# Patient Record
Sex: FEMALE | Race: WHITE | NOT HISPANIC OR LATINO | Employment: UNEMPLOYED | URBAN - METROPOLITAN AREA
[De-identification: names, ages, dates, MRNs, and addresses within clinical notes are randomized per-mention and may not be internally consistent; named-entity substitution may affect disease eponyms.]

---

## 2018-09-25 ENCOUNTER — HOSPITAL ENCOUNTER (OUTPATIENT)
Dept: PHYSICAL THERAPY | Facility: HOSPITAL | Age: 58
Setting detail: THERAPIES SERIES
Discharge: HOME | End: 2018-09-25

## 2018-09-25 DIAGNOSIS — M54.9 BACK PAIN, UNSPECIFIED BACK LOCATION, UNSPECIFIED BACK PAIN LATERALITY, UNSPECIFIED CHRONICITY: Primary | ICD-10-CM

## 2018-09-25 PROCEDURE — 97161 PT EVAL LOW COMPLEX 20 MIN: CPT | Performed by: PHYSICAL THERAPIST

## 2018-09-26 PROCEDURE — G8982 BODY POS GOAL STATUS: HCPCS | Performed by: PHYSICAL THERAPIST

## 2018-09-26 PROCEDURE — G8981 BODY POS CURRENT STATUS: HCPCS | Performed by: PHYSICAL THERAPIST

## 2018-09-26 NOTE — THERAPY EVALUATION
Outpatient Physical Therapy Ortho Initial Evaluation  RON Mariee     Patient Name: Noemi Magallon  : 1960  MRN: [id number]  Today's Date: 2018      Visit Date: 2018    There is no problem list on file for this patient.       Past Medical History:   Diagnosis Date   • Arthritis    • Back pain    • Disease of thyroid gland    • Hyperlipidemia    • Hypertension         Past Surgical History:   Procedure Laterality Date   • APPENDECTOMY     •  SECTION     • CHOLECYSTECTOMY         Visit Dx:     ICD-10-CM ICD-9-CM   1. Back pain, unspecified back location, unspecified back pain laterality, unspecified chronicity M54.9 724.5             Patient History     Row Name 18 1100             History    Chief Complaint Difficulty Walking;Difficulty with daily activities;Pain  -GC      Type of Pain Back pain  -GC      Brief Description of Current Complaint Pt reports her back has been bothering for over 3 years. She states she has had neck surgery for a bad disc already. She also states she has had therapy on her back before and states it did not help and she does not think it will help this time either. She states she has to do therapy so her insurance company will approve an MRI.   -GC      Patient/Caregiver Goals Relieve pain   Pt says therapy will not help  -GC      Patient's Rating of General Health Fair  -GC      Hand Dominance right-handed  -GC         Pain     Pain Location Back  -GC      Pain at Present 6  -GC      Pain at Best 6  -GC      Pain at Worst 10  -GC      Pain Frequency Constant/continuous  -GC      Pain Description Discomfort;Sore;Sharp;Tender  -GC      What Performance Factors Make the Current Problem(s) WORSE? Pt c/o constant pain that increases with any and all movements  -GC      What Performance Factors Make the Current Problem(s) BETTER? Pt feels her best when she lies down and rests, but even then she c/o pain at a 6/10.  -GC      Is your sleep disturbed? Yes  -GC       Difficulties with ADL's? Pt has pain with all household chores  -GC         Daily Activities    Primary Language English  -GC      Are you able to read Yes  -GC      Are you able to write Yes  -GC      How does patient learn best? Listening  -GC      Teaching needs identified Home Exercise Program;Management of Condition  -GC      Patient is concerned about/has problems with Walking;Transfers (getting out of a chair, bed);Standing;Performing job responsibilities/community activities (work, school,;Performing home management (household chores, shopping, care of dependents);Grasping objects lifting;Difficulty with self care (i.e. bathing, dressing, toileting:  -GC      Does patient have problems with the following? None  -GC      Barriers to learning None  -GC      Functional Status mobility issues preventing performance of daily activities  -GC      Pt Participated in POC and Goals Yes  -GC         Safety    Are you being hurt, hit, or frightened by anyone at home or in your life? No  -GC      Are you being neglected by a caregiver No  -GC        User Key  (r) = Recorded By, (t) = Taken By, (c) = Cosigned By    Initials Name Provider Type    GC Bhaskar Borja, PT Physical Therapist                PT Ortho     Row Name 09/25/18 1100       Posture/Observations    Lumbar lordosis Decreased  -GC    Posture/Observations Comments Pt has mild latreal shift of her shoulders to the right  -GC       Sensory Screen for Light Touch- Lower Quarter Clearing    L2 (anterior mid thigh) Bilateral:;Intact  -GC    L3 (distal anterior thigh) Bilateral:;Intact  -GC    L4 (medial lower leg/foot) Bilateral:;Intact  -GC    L5 (lateral lower leg/great toe) Bilateral:;Intact  -GC    S1 (bottom of foot) Bilateral:;Intact  -GC       Myotomal Screen- Lower Quarter Clearing    Hip flexion (L2) Bilateral:;5 (Normal)  -GC    Knee extension (L3) Bilateral:;5 (Normal)  -GC    Ankle DF (L4) Bilateral:;5 (Normal)  -GC    Ankle PF (S1) Bilateral:;5  (Normal)  -GC    Knee flexion (S2) Bilateral:;5 (Normal)  -GC       Lumbar/SI Special Tests    SLR (Neural Tension) Bilateral:;Negative  -GC    CHAVEZ (hip vs. SI Dysfunction) Bilateral:;Negative  -GC       Lumbosacral Palpation    SI Bilateral:;Tender  -GC    Lumbosacral Segment Tender   L2-S1  -GC    Quadratus Lumborum Bilateral:;Tender;Guarded/taut  -GC    Erector Spinae (Paraspinals) Bilateral:;Tender;Guarded/taut  -GC       Head/Neck/Trunk    Trunk Extension AROM 10% range with pain  -GC    Trunk Flexion AROM 25% range with pain  -GC    Trunk Lt Lateral Flexion AROM 50% range with pain  -GC    Trunk Rt Lateral Flexion AROM 50% range with pain  -GC    Trunk Lt Rotation AROM 50% range with pain  -GC    Trunk Rt Rotation AROM 50% range with pain  -GC       MMT Neck/Trunk    Trunk Flexion MMT, Gross Movement (3+/5) fair plus  -GC    Trunk Extension MMT, Gross Movement (4/5) good  -GC       Lower Extremity Flexibility    Hamstrings Bilateral:;Severely limited  -GC    Hip Flexors Bilateral:;Mildly limited  -GC    Hip External Rotators Bilateral:;Moderately limited  -GC    Hip Internal Rotators Bilateral:;Moderately limited  -GC    Quadratus Lumborum Bilateral:;Severely limited  -GC       Transfers    Comment (Transfers) Pt is independent with all bed mobility and transfers, but she has paingoing sit to/from supine and rolling supine to/from prone  -GC       Gait/Stairs Assessment/Training    Comment (Gait/Stairs) Pt ambulates well on levels   -      User Key  (r) = Recorded By, (t) = Taken By, (c) = Cosigned By    Initials Name Provider Type    GC Bhaskar Borja, PT Physical Therapist                      Therapy Education  Given: HEP, Symptoms/condition management, Pain management, Posture/body mechanics  Program: New  How Provided: Verbal, Demonstration  Provided to: Patient  Level of Understanding: Teach back education performed, Verbalized, Demonstrated           PT OP Goals     Row Name 09/25/18 1100           PT Short Term Goals    STG Date to Achieve 10/10/18  -GC     STG 1 Decrease LBP to 3-4/10 with activity.  -GC     STG 2 Increase trunk AROM to at least 50% range all plans with testing.  -GC     STG 3 Increase LE and trunk flexibility to a moderate restriction with testing.  -GC     STG 4 Pt will be independent with her HEP issued by this therapist.  -GC        Long Term Goals    LTG Date to Achieve 10/24/18  -GC     LTG 1 Decrease LBP to 1-2/10 with activity.  -GC     LTG 2 Increase trunk AROM to at least 75% range all plans with testing.  -GC     LTG 3 Increase LE and trunk flexibility to a minimal restriction with testing.  -GC     LTG 4 Increase trunk strength to at least 4+/5 with testing.  -GC     LTG 5 Pt will be independent with all ADLs without increased pain.  -        Time Calculation    PT Goal Re-Cert Due Date 10/24/18  -       User Key  (r) = Recorded By, (t) = Taken By, (c) = Cosigned By    Initials Name Provider Type     Bhaskar Borja, PT Physical Therapist                PT Assessment/Plan     Row Name 09/25/18 1100          PT Assessment    Functional Limitations Limitation in home management;Limitations in community activities;Limitations in functional capacity and performance;Performance in leisure activities;Performance in self-care ADL  -     Impairments Impaired flexibility;Range of motion;Pain;Muscle strength  -     Assessment Comments Pt presents with a several year history of back pain that she rates up to 10/10 with activity. She has decreased trunk ROM, decreased core strength, decreased LE and trunk flexibility, and decreased function secondary to the above. Pt has already stated that therapy has not helped in the past and that she does not think it will help this time.  -     Please refer to paper survey for additional self-reported information Yes  -GC     Rehab Potential Fair  -GC     Patient/caregiver participated in establishment of treatment plan and goals Yes  -GC      Patient would benefit from skilled therapy intervention Yes  -GC        PT Plan    PT Frequency 1x/week;2x/week  -GC     Predicted Duration of Therapy Intervention (Therapy Eval) 4 weeks  -GC     Planned CPT's? PT EVAL LOW COMPLEXITY: 37762;PT THER PROC EA 15 MIN: 07435;PT MANUAL THERAPY EA 15 MIN: 87701;PT ELECTRICAL STIM UNATTEND: ;PT HOT OR COLD PACK TREAT MCARE  -GC     PT Plan Comments Pt is to continue her HEP 2x daily  -GC       User Key  (r) = Recorded By, (t) = Taken By, (c) = Cosigned By    Initials Name Provider Type     Bhaskar Borja, PT Physical Therapist                  Exercises     Row Name 09/25/18 1100             Exercise 1    Exercise Name 1 Hamstring stretch-bilateral  -GC      Cueing 1 Verbal;Tactile  -GC      Reps 1 10  -GC      Time 1 5 secs  -GC         Exercise 2    Exercise Name 2 LTR stretch-bilateral  -GC      Cueing 2 Verbal;Tactile  -GC      Reps 2 10  -GC      Time 2 5 secs  -GC         Exercise 3    Exercise Name 3 SKTC-bilateral  -GC      Cueing 3 Verbal;Tactile  -GC      Reps 3 10  -GC      Time 3 5 secs  -GC        User Key  (r) = Recorded By, (t) = Taken By, (c) = Cosigned By    Initials Name Provider Type     Bhaskar Borja, PT Physical Therapist                                  Time Calculation:     Therapy Suggested Charges     Code   Minutes Charges    None             Start Time: 1100  Stop Time: 1157  Time Calculation (min): 57 min     Therapy Charges for Today     Code Description Service Date Service Provider Modifiers Qty    15298154326  PT EVAL LOW COMPLEXITY 2 9/25/2018 Bhaskar Borja, PT GP 1    [id number]  PT CHNG MAIN POS CURRENT 9/26/2018 Bhaskar Borja, PT GP, CK 1    [id number]  PT CHNG MAIN POS PROJECTED 9/26/2018 Bhaskar Borja, PT GP, CI 1          PT G-Codes  PT Professional Judgement Used?: Yes (based on observed limitations in ROM, flexibility, strength, and functional mobility)  Functional Limitation: Changing and maintaining body  position  Changing and Maintaining Body Position Current Status (): At least 40 percent but less than 60 percent impaired, limited or restricted  Changing and Maintaining Body Position Goal Status (): At least 1 percent but less than 20 percent impaired, limited or restricted         Bhaskar Borja, PT  9/26/2018

## 2018-10-02 ENCOUNTER — HOSPITAL ENCOUNTER (OUTPATIENT)
Dept: PHYSICAL THERAPY | Facility: HOSPITAL | Age: 58
Setting detail: THERAPIES SERIES
Discharge: HOME | End: 2018-10-02

## 2018-10-02 DIAGNOSIS — M54.9 BACK PAIN, UNSPECIFIED BACK LOCATION, UNSPECIFIED BACK PAIN LATERALITY, UNSPECIFIED CHRONICITY: Primary | ICD-10-CM

## 2018-10-02 PROCEDURE — 97110 THERAPEUTIC EXERCISES: CPT | Performed by: PHYSICAL THERAPIST

## 2018-10-02 NOTE — THERAPY TREATMENT NOTE
Outpatient Physical Therapy Ortho Treatment Note  [facility]     Patient Name: Noemi Magallon  : 1960  MRN: [id number]  Today's Date: 10/2/2018      Visit Date: 10/02/2018    Visit Dx:    ICD-10-CM ICD-9-CM   1. Back pain, unspecified back location, unspecified back pain laterality, unspecified chronicity M54.9 724.5       There is no problem list on file for this patient.       Past Medical History:   Diagnosis Date   • Arthritis    • Back pain    • Disease of thyroid gland    • Hyperlipidemia    • Hypertension         Past Surgical History:   Procedure Laterality Date   • APPENDECTOMY     •  SECTION     • CHOLECYSTECTOMY               PT Ortho     Row Name 10/02/18 0850       Subjective Comments    Subjective Comments Pt states her back hurts worse this morning.  -GC       Subjective Pain    Able to rate subjective pain? yes  -    Pre-Treatment Pain Level 8  -GC      User Key  (r) = Recorded By, (t) = Taken By, (c) = Cosigned By    Initials Name Provider Type     Bhaskar Borja, PT Physical Therapist                            PT Assessment/Plan     Row Name 10/02/18 0850          PT Assessment    Assessment Comments Pt is still limited by pain and tolerated exercises fair today.  -GC        PT Plan    PT Plan Comments Pt is to continue her HEP daily.  -GC       User Key  (r) = Recorded By, (t) = Taken By, (c) = Cosigned By    Initials Name Provider Type    Bhaskar Conteh, PT Physical Therapist                    Exercises     Row Name 10/02/18 0850             Subjective Comments    Subjective Comments Pt states her back hurts worse this morning.  -GC         Subjective Pain    Able to rate subjective pain? yes  -      Pre-Treatment Pain Level 8  -GC         Exercise 1    Exercise Name 1 Hamstring stretch-bilateral  -GC      Cueing 1 Verbal;Tactile  -GC      Reps 1 10  -GC      Time 1 5 secs  -GC         Exercise 2    Exercise Name 2 LTR stretch-bilateral  -GC      Cueing 2  Verbal;Tactile  -GC      Reps 2 10  -GC      Time 2 5 secs  -GC         Exercise 3    Exercise Name 3 SKTC-bilateral  -GC      Cueing 3 Verbal;Tactile  -GC      Reps 3 10  -GC      Time 3 5 secs  -GC         Exercise 4    Exercise Name 4 Sidelying trunk rotaional stretch-bilateral  -GC      Cueing 4 Verbal;Tactile  -GC      Reps 4 10  -GC      Time 4 5 secs  -GC         Exercise 5    Exercise Name 5 PPT  -GC      Reps 5 7  -GC         Exercise 6    Exercise Name 6 Bridging  -GC      Reps 6 10  -GC        User Key  (r) = Recorded By, (t) = Taken By, (c) = Cosigned By    Initials Name Provider Type     Bhaskar Borja, PT Physical Therapist                                            Time Calculation:   Start Time: 0850  Stop Time: 0914  Time Calculation (min): 24 min  Therapy Suggested Charges     Code   Minutes Charges    None           Therapy Charges for Today     Code Description Service Date Service Provider Modifiers Qty    31640682937 HC PT THER PROC EA 15 MIN 10/2/2018 Bhaskar Borja, PT GP 1                    Bhaskar Borja PT  10/2/2018

## 2018-10-08 ENCOUNTER — APPOINTMENT (OUTPATIENT)
Dept: PHYSICAL THERAPY | Facility: HOSPITAL | Age: 58
End: 2018-10-08

## 2019-11-11 ENCOUNTER — OFFICE VISIT (OUTPATIENT)
Dept: INTERNAL MEDICINE | Facility: CLINIC | Age: 59
End: 2019-11-11

## 2019-11-11 VITALS
DIASTOLIC BLOOD PRESSURE: 66 MMHG | HEIGHT: 64 IN | OXYGEN SATURATION: 94 % | SYSTOLIC BLOOD PRESSURE: 108 MMHG | BODY MASS INDEX: 34.49 KG/M2 | HEART RATE: 104 BPM | WEIGHT: 202 LBS

## 2019-11-11 DIAGNOSIS — E03.9 ACQUIRED HYPOTHYROIDISM: ICD-10-CM

## 2019-11-11 DIAGNOSIS — G89.29 CHRONIC THORACIC BACK PAIN, UNSPECIFIED BACK PAIN LATERALITY: ICD-10-CM

## 2019-11-11 DIAGNOSIS — R06.02 SHORTNESS OF BREATH: ICD-10-CM

## 2019-11-11 DIAGNOSIS — M54.6 CHRONIC THORACIC BACK PAIN, UNSPECIFIED BACK PAIN LATERALITY: ICD-10-CM

## 2019-11-11 DIAGNOSIS — M47.812 CERVICAL SPONDYLOSIS: ICD-10-CM

## 2019-11-11 DIAGNOSIS — I10 ESSENTIAL HYPERTENSION: Primary | ICD-10-CM

## 2019-11-11 DIAGNOSIS — R01.1 MURMUR, HEART: ICD-10-CM

## 2019-11-11 PROCEDURE — 99204 OFFICE O/P NEW MOD 45 MIN: CPT | Performed by: NURSE PRACTITIONER

## 2019-11-11 PROCEDURE — 93000 ELECTROCARDIOGRAM COMPLETE: CPT | Performed by: NURSE PRACTITIONER

## 2019-11-11 RX ORDER — CYCLOBENZAPRINE HCL 10 MG
TABLET ORAL
COMMUNITY
Start: 2018-04-14 | End: 2021-07-10

## 2019-11-11 RX ORDER — LISINOPRIL AND HYDROCHLOROTHIAZIDE 12.5; 1 MG/1; MG/1
1 TABLET ORAL DAILY
Refills: 3 | COMMUNITY
Start: 2019-09-27 | End: 2023-03-10 | Stop reason: SDUPTHER

## 2019-11-11 RX ORDER — MELOXICAM 7.5 MG/1
7.5 TABLET ORAL 2 TIMES DAILY
Refills: 2 | COMMUNITY
Start: 2019-10-13 | End: 2021-07-10

## 2019-11-11 NOTE — PROGRESS NOTES
Procedure     ECG 12 Lead  Date/Time: 11/11/2019 11:23 AM  Performed by: Erica Del Rio APRN  Authorized by: Erica Del Rio APRN   Comparison: not compared with previous ECG   Previous ECG: no previous ECG available  Rhythm: sinus rhythm  BPM: 102  Conduction: conduction normal  ST Segments: ST segments normal  T Waves: T waves normal    Clinical impression: normal ECG

## 2019-11-11 NOTE — PROGRESS NOTES
"Subjective    Noemi Magallon is a 58 y.o. female presenting today for   Chief Complaint   Patient presents with   • Establish Care     New Pt.   • Hypertension   • Hypothyroidism       History of Present Illness     Noemi Magallon presents today as a new patient to me to establish care.   Prior PCP was Ben Santiago, and her current/chronic health conditions include:    Patient Active Problem List   Diagnosis   • HTN (hypertension)   • Cervical spondylosis   • Thoracic back pain   • Acquired hypothyroidism     HTN - checking BP at home 110s/60-70s, kirk meds well w/o SEs. Denies CP. She does report SOB especially with walking, no cough. Occas palpitations. EKG approx 1yr ago w/ CPE was \"normal.\"    Hypothyroid - on replacement x2yrs, stable on 125mcg x1 yr. Reports recent unexplained wgt gain over the last 5-6 months.    Chronic pain and back issues - sees Ct for Neurosurg. Taking Norco PRN generally at least once weekly.    New complaints today include: none    The following portions of the patient's history were reviewed and updated as appropriate: allergies, current medications, past family history, past medical history, past social history, past surgical history and problem list.    Review of Systems   Constitutional: Positive for unexpected weight gain.   Respiratory: Positive for shortness of breath. Negative for cough.    Cardiovascular: Positive for palpitations. Negative for chest pain.   Musculoskeletal: Positive for back pain.   All other systems reviewed and are negative.      Objective    Vitals:    11/11/19 0953   BP: 108/66   Pulse: 104   SpO2: 94%   Weight: 91.6 kg (202 lb)   Height: 162.6 cm (64\")     Body mass index is 34.67 kg/m².  Nursing notes and vitals reviewed.    Physical Exam   Constitutional: She is oriented to person, place, and time. She appears well-developed and well-nourished. No distress.   HENT:   Right Ear: Hearing, tympanic membrane, external ear and ear canal normal.   Left Ear: " Hearing, tympanic membrane, external ear and ear canal normal.   Nose: Nose normal.   Mouth/Throat: Uvula is midline, oropharynx is clear and moist and mucous membranes are normal.   Neck: Neck supple. No thyroid mass and no thyromegaly present.   Cardiovascular: Regular rhythm, S1 normal, S2 normal and normal pulses. Exam reveals no gallop and no friction rub.   Murmur heard.   Systolic murmur is present with a grade of 2/6.  Pulmonary/Chest: Effort normal and breath sounds normal. She has no wheezes. She has no rhonchi. She has no rales.   Lymphadenopathy:     She has no cervical adenopathy.   Neurological: She is alert and oriented to person, place, and time. No cranial nerve deficit or sensory deficit.   Psychiatric: She has a normal mood and affect. Her speech is normal and behavior is normal. She is attentive.       Assessment and Plan    Noemi was seen today for establish care, hypertension and hypothyroidism.    Diagnoses and all orders for this visit:    Essential hypertension  -     CBC & Differential; Future  -     Comprehensive Metabolic Panel; Future  -     Lipid Panel With / Chol / HDL Ratio; Future  -     Microalbumin / Creatinine Urine Ratio - Urine, Clean Catch; Future    Acquired hypothyroidism  -     TSH; Future  -     T4, Free; Future    Cervical spondylosis  -     Ambulatory Referral to Pain Management    Chronic thoracic back pain, unspecified back pain laterality  -     Ambulatory Referral to Pain Management    Shortness of breath  -     Full Pulmonary Function Test With Bronchodilator; Future  -     Adult Transthoracic Echo Complete W/ Cont if Necessary Per Protocol; Future    Murmur, heart  -     Adult Transthoracic Echo Complete W/ Cont if Necessary Per Protocol; Future    Ms. Magallon will continue with amlodipine and lisinopril/hctz. She will continue with her current dose of Levothyroxine pending lab results.    I will refer her to pain management for her chronic neck and back pain and  to assume prescribing of her long-term narcotic therapy.    My office will request her previous records.    Return in about 4 weeks (around 12/9/2019) for Medicare Wellness.

## 2019-11-16 ENCOUNTER — RESULTS ENCOUNTER (OUTPATIENT)
Dept: INTERNAL MEDICINE | Facility: CLINIC | Age: 59
End: 2019-11-16

## 2019-11-16 DIAGNOSIS — I10 ESSENTIAL HYPERTENSION: ICD-10-CM

## 2019-11-16 DIAGNOSIS — E03.9 ACQUIRED HYPOTHYROIDISM: ICD-10-CM

## 2019-11-26 ENCOUNTER — TRANSCRIBE ORDERS (OUTPATIENT)
Dept: ADMINISTRATIVE | Facility: HOSPITAL | Age: 59
End: 2019-11-26

## 2019-11-26 DIAGNOSIS — R07.89 CHEST DISCOMFORT: ICD-10-CM

## 2019-11-26 DIAGNOSIS — Z01.810 PRE-OPERATIVE CARDIOVASCULAR EXAMINATION, NEW EKG ABNORMALITIES C/W ISCHEMIA: ICD-10-CM

## 2019-11-26 DIAGNOSIS — R94.31 PRE-OPERATIVE CARDIOVASCULAR EXAMINATION, NEW EKG ABNORMALITIES C/W ISCHEMIA: ICD-10-CM

## 2019-11-26 DIAGNOSIS — Z12.31 SCREENING MAMMOGRAM, ENCOUNTER FOR: Primary | ICD-10-CM

## 2019-11-26 DIAGNOSIS — Z78.0 POST-MENOPAUSAL: Primary | ICD-10-CM

## 2019-11-26 DIAGNOSIS — R94.31 ABNORMAL EKG: ICD-10-CM

## 2019-12-16 ENCOUNTER — HOSPITAL ENCOUNTER (OUTPATIENT)
Dept: CARDIOLOGY | Facility: HOSPITAL | Age: 59
Discharge: HOME | End: 2019-12-16
Admitting: NURSE PRACTITIONER

## 2019-12-16 ENCOUNTER — HOSPITAL ENCOUNTER (OUTPATIENT)
Dept: CARDIOLOGY | Facility: HOSPITAL | Age: 59
Discharge: HOME | End: 2019-12-16

## 2019-12-16 ENCOUNTER — APPOINTMENT (OUTPATIENT)
Dept: BONE DENSITY | Facility: HOSPITAL | Age: 59
End: 2019-12-16

## 2019-12-16 VITALS
BODY MASS INDEX: 34.49 KG/M2 | HEIGHT: 64 IN | SYSTOLIC BLOOD PRESSURE: 101 MMHG | WEIGHT: 202 LBS | DIASTOLIC BLOOD PRESSURE: 63 MMHG

## 2019-12-16 DIAGNOSIS — R94.31 ABNORMAL EKG: ICD-10-CM

## 2019-12-16 DIAGNOSIS — R07.89 CHEST DISCOMFORT: ICD-10-CM

## 2019-12-16 DIAGNOSIS — Z78.0 POST-MENOPAUSAL: ICD-10-CM

## 2019-12-16 LAB
BH CV ECHO MEAS - ACS: 1.7 CM
BH CV ECHO MEAS - AO MAX PG (FULL): 5.3 MMHG
BH CV ECHO MEAS - AO MAX PG: 14.7 MMHG
BH CV ECHO MEAS - AO MEAN PG (FULL): 2.5 MMHG
BH CV ECHO MEAS - AO MEAN PG: 7.5 MMHG
BH CV ECHO MEAS - AO ROOT AREA (BSA CORRECTED): 1.5
BH CV ECHO MEAS - AO ROOT AREA: 6.6 CM^2
BH CV ECHO MEAS - AO ROOT DIAM: 2.9 CM
BH CV ECHO MEAS - AO V2 MAX: 192 CM/SEC
BH CV ECHO MEAS - AO V2 MEAN: 126.5 CM/SEC
BH CV ECHO MEAS - AO V2 VTI: 35.4 CM
BH CV ECHO MEAS - ASC AORTA: 2.6 CM
BH CV ECHO MEAS - AVA(I,A): 2.1 CM^2
BH CV ECHO MEAS - AVA(I,D): 2.1 CM^2
BH CV ECHO MEAS - AVA(V,A): 2 CM^2
BH CV ECHO MEAS - AVA(V,D): 2 CM^2
BH CV ECHO MEAS - BSA(HAYCOCK): 2.1 M^2
BH CV ECHO MEAS - BSA: 2 M^2
BH CV ECHO MEAS - BZI_BMI: 34.7 KILOGRAMS/M^2
BH CV ECHO MEAS - BZI_METRIC_HEIGHT: 162.6 CM
BH CV ECHO MEAS - BZI_METRIC_WEIGHT: 91.6 KG
BH CV ECHO MEAS - EDV(CUBED): 23.4 ML
BH CV ECHO MEAS - EDV(MOD-SP2): 87.2 ML
BH CV ECHO MEAS - EDV(MOD-SP4): 83.9 ML
BH CV ECHO MEAS - EDV(TEICH): 31.1 ML
BH CV ECHO MEAS - EF(CUBED): 83.1 %
BH CV ECHO MEAS - EF(MOD-BP): 74 %
BH CV ECHO MEAS - EF(MOD-SP2): 78.1 %
BH CV ECHO MEAS - EF(MOD-SP4): 70.1 %
BH CV ECHO MEAS - EF(TEICH): 77.7 %
BH CV ECHO MEAS - ESV(CUBED): 3.9 ML
BH CV ECHO MEAS - ESV(MOD-SP2): 19.1 ML
BH CV ECHO MEAS - ESV(MOD-SP4): 25.1 ML
BH CV ECHO MEAS - ESV(TEICH): 6.9 ML
BH CV ECHO MEAS - FS: 44.8 %
BH CV ECHO MEAS - IVS/LVPW: 1.1
BH CV ECHO MEAS - IVSD: 1.3 CM
BH CV ECHO MEAS - LAT PEAK E' VEL: 7 CM/SEC
BH CV ECHO MEAS - LV DIASTOLIC VOL/BSA (35-75): 42.7 ML/M^2
BH CV ECHO MEAS - LV MASS(C)D: 105.7 GRAMS
BH CV ECHO MEAS - LV MASS(C)DI: 53.8 GRAMS/M^2
BH CV ECHO MEAS - LV MAX PG: 9.5 MMHG
BH CV ECHO MEAS - LV MEAN PG: 5 MMHG
BH CV ECHO MEAS - LV SYSTOLIC VOL/BSA (12-30): 12.8 ML/M^2
BH CV ECHO MEAS - LV V1 MAX: 154 CM/SEC
BH CV ECHO MEAS - LV V1 MEAN: 99 CM/SEC
BH CV ECHO MEAS - LV V1 VTI: 29.1 CM
BH CV ECHO MEAS - LVIDD: 2.9 CM
BH CV ECHO MEAS - LVIDS: 1.6 CM
BH CV ECHO MEAS - LVLD AP2: 8.2 CM
BH CV ECHO MEAS - LVLD AP4: 8.3 CM
BH CV ECHO MEAS - LVLS AP2: 6.9 CM
BH CV ECHO MEAS - LVLS AP4: 6.6 CM
BH CV ECHO MEAS - LVOT AREA (M): 2.5 CM^2
BH CV ECHO MEAS - LVOT AREA: 2.5 CM^2
BH CV ECHO MEAS - LVOT DIAM: 1.8 CM
BH CV ECHO MEAS - LVPWD: 1.2 CM
BH CV ECHO MEAS - MED PEAK E' VEL: 7 CM/SEC
BH CV ECHO MEAS - MV A DUR: 0.17 SEC
BH CV ECHO MEAS - MV A MAX VEL: 100 CM/SEC
BH CV ECHO MEAS - MV DEC SLOPE: 401 CM/SEC^2
BH CV ECHO MEAS - MV DEC TIME: 222 SEC
BH CV ECHO MEAS - MV E MAX VEL: 87.8 CM/SEC
BH CV ECHO MEAS - MV E/A: 0.88
BH CV ECHO MEAS - MV MAX PG: 5.1 MMHG
BH CV ECHO MEAS - MV MEAN PG: 2 MMHG
BH CV ECHO MEAS - MV P1/2T MAX VEL: 97.7 CM/SEC
BH CV ECHO MEAS - MV P1/2T: 71.4 MSEC
BH CV ECHO MEAS - MV V2 MAX: 113 CM/SEC
BH CV ECHO MEAS - MV V2 MEAN: 70.8 CM/SEC
BH CV ECHO MEAS - MV V2 VTI: 25.2 CM
BH CV ECHO MEAS - MVA P1/2T LCG: 2.3 CM^2
BH CV ECHO MEAS - MVA(P1/2T): 3.1 CM^2
BH CV ECHO MEAS - MVA(VTI): 2.9 CM^2
BH CV ECHO MEAS - PA ACC TIME: 0.12 SEC
BH CV ECHO MEAS - PA MAX PG (FULL): 3.2 MMHG
BH CV ECHO MEAS - PA MAX PG: 5.8 MMHG
BH CV ECHO MEAS - PA PR(ACCEL): 23.2 MMHG
BH CV ECHO MEAS - PA V2 MAX: 120 CM/SEC
BH CV ECHO MEAS - PULM A REVS DUR: 0.12 SEC
BH CV ECHO MEAS - PULM A REVS VEL: 37.4 CM/SEC
BH CV ECHO MEAS - PULM DIAS VEL: 39.7 CM/SEC
BH CV ECHO MEAS - PULM S/D: 1.5
BH CV ECHO MEAS - PULM SYS VEL: 61.2 CM/SEC
BH CV ECHO MEAS - PVA(V,A): 1.5 CM^2
BH CV ECHO MEAS - PVA(V,D): 1.5 CM^2
BH CV ECHO MEAS - QP/QS: 0.47
BH CV ECHO MEAS - RAP SYSTOLE: 3 MMHG
BH CV ECHO MEAS - RV MAX PG: 2.5 MMHG
BH CV ECHO MEAS - RV MEAN PG: 1 MMHG
BH CV ECHO MEAS - RV V1 MAX: 79.8 CM/SEC
BH CV ECHO MEAS - RV V1 MEAN: 50.8 CM/SEC
BH CV ECHO MEAS - RV V1 VTI: 15.3 CM
BH CV ECHO MEAS - RVOT AREA: 2.3 CM^2
BH CV ECHO MEAS - RVOT DIAM: 1.7 CM
BH CV ECHO MEAS - SI(AO): 118.9 ML/M^2
BH CV ECHO MEAS - SI(CUBED): 9.9 ML/M^2
BH CV ECHO MEAS - SI(LVOT): 37.7 ML/M^2
BH CV ECHO MEAS - SI(MOD-SP2): 34.7 ML/M^2
BH CV ECHO MEAS - SI(MOD-SP4): 29.9 ML/M^2
BH CV ECHO MEAS - SI(TEICH): 12.3 ML/M^2
BH CV ECHO MEAS - SV(AO): 233.5 ML
BH CV ECHO MEAS - SV(CUBED): 19.4 ML
BH CV ECHO MEAS - SV(LVOT): 74.1 ML
BH CV ECHO MEAS - SV(MOD-SP2): 68.1 ML
BH CV ECHO MEAS - SV(MOD-SP4): 58.8 ML
BH CV ECHO MEAS - SV(RVOT): 34.7 ML
BH CV ECHO MEAS - SV(TEICH): 24.2 ML
BH CV ECHO MEAS - TAPSE (>1.6): 2.1 CM
BH CV ECHO MEASUREMENTS AVERAGE E/E' RATIO: 12.54
BH CV STRESS BP STAGE 1: NORMAL
BH CV STRESS DURATION MIN STAGE 1: 3
BH CV STRESS DURATION SEC STAGE 1: 0
BH CV STRESS GRADE STAGE 1: 10
BH CV STRESS HR STAGE 1: 140
BH CV STRESS METS STAGE 1: 5
BH CV STRESS PROTOCOL 1: NORMAL
BH CV STRESS RECOVERY BP: NORMAL MMHG
BH CV STRESS RECOVERY HR: 115 BPM
BH CV STRESS SPEED STAGE 1: 1.7
BH CV STRESS STAGE 1: 1
BH CV VAS BP LEFT ARM: NORMAL MMHG
BH CV XLRA - RV BASE: 2.7 CM
BH CV XLRA - TDI S': 13 CM/SEC
LEFT ATRIUM VOLUME INDEX: 12 ML/M2
MAXIMAL PREDICTED HEART RATE: 162 BPM
MAXIMAL PREDICTED HEART RATE: 162 BPM
PERCENT MAX PREDICTED HR: 86.42 %
STRESS BASELINE BP: NORMAL MMHG
STRESS BASELINE HR: 101 BPM
STRESS O2 SAT REST: 95 %
STRESS PERCENT HR: 102 %
STRESS POST ESTIMATED WORKLOAD: 4.6 METS
STRESS POST EXERCISE DUR MIN: 3 MIN
STRESS POST EXERCISE DUR SEC: 0 SEC
STRESS POST O2 SAT PEAK: 95 %
STRESS POST PEAK BP: NORMAL MMHG
STRESS POST PEAK HR: 140 BPM
STRESS TARGET HR: 138 BPM
STRESS TARGET HR: 138 BPM

## 2019-12-16 PROCEDURE — 93018 CV STRESS TEST I&R ONLY: CPT | Performed by: INTERNAL MEDICINE

## 2019-12-16 PROCEDURE — 77080 DXA BONE DENSITY AXIAL: CPT

## 2019-12-16 PROCEDURE — 25010000002 PERFLUTREN (DEFINITY) 8.476 MG IN SODIUM CHLORIDE 0.9 % 10 ML INJECTION: Performed by: NURSE PRACTITIONER

## 2019-12-16 PROCEDURE — 93017 CV STRESS TEST TRACING ONLY: CPT

## 2019-12-16 PROCEDURE — 93306 TTE W/DOPPLER COMPLETE: CPT

## 2019-12-16 PROCEDURE — 93016 CV STRESS TEST SUPVJ ONLY: CPT | Performed by: INTERNAL MEDICINE

## 2019-12-16 PROCEDURE — 93306 TTE W/DOPPLER COMPLETE: CPT | Performed by: INTERNAL MEDICINE

## 2019-12-16 RX ADMIN — PERFLUTREN 2 ML: 6.52 INJECTION, SUSPENSION INTRAVENOUS at 13:00

## 2019-12-18 ENCOUNTER — HOSPITAL ENCOUNTER (OUTPATIENT)
Dept: MAMMOGRAPHY | Facility: HOSPITAL | Age: 59
Discharge: HOME | End: 2019-12-18
Admitting: NURSE PRACTITIONER

## 2019-12-18 DIAGNOSIS — Z12.31 SCREENING MAMMOGRAM, ENCOUNTER FOR: ICD-10-CM

## 2019-12-18 PROCEDURE — 77063 BREAST TOMOSYNTHESIS BI: CPT

## 2019-12-18 PROCEDURE — 77067 SCR MAMMO BI INCL CAD: CPT

## 2021-06-29 ENCOUNTER — TELEPHONE (OUTPATIENT)
Dept: ONCOLOGY | Facility: CLINIC | Age: 61
End: 2021-06-29

## 2021-07-10 ENCOUNTER — APPOINTMENT (OUTPATIENT)
Dept: GENERAL RADIOLOGY | Facility: HOSPITAL | Age: 61
End: 2021-07-10

## 2021-07-10 ENCOUNTER — HOSPITAL ENCOUNTER (EMERGENCY)
Facility: HOSPITAL | Age: 61
Discharge: HOME | End: 2021-07-10
Attending: EMERGENCY MEDICINE | Admitting: EMERGENCY MEDICINE

## 2021-07-10 VITALS
BODY MASS INDEX: 36.14 KG/M2 | WEIGHT: 204 LBS | SYSTOLIC BLOOD PRESSURE: 111 MMHG | HEART RATE: 91 BPM | OXYGEN SATURATION: 96 % | DIASTOLIC BLOOD PRESSURE: 60 MMHG | TEMPERATURE: 99.9 F | RESPIRATION RATE: 16 BRPM | HEIGHT: 63 IN

## 2021-07-10 DIAGNOSIS — M10.9 ACUTE GOUT OF LEFT FOOT, UNSPECIFIED CAUSE: Primary | ICD-10-CM

## 2021-07-10 LAB
BASOPHILS # BLD AUTO: 0.07 10*3/MM3 (ref 0–0.2)
BASOPHILS NFR BLD AUTO: 0.5 % (ref 0–1.5)
DEPRECATED RDW RBC AUTO: 44.8 FL (ref 37–54)
EOSINOPHIL # BLD AUTO: 0.45 10*3/MM3 (ref 0–0.4)
EOSINOPHIL NFR BLD AUTO: 3.2 % (ref 0.3–6.2)
ERYTHROCYTE [DISTWIDTH] IN BLOOD BY AUTOMATED COUNT: 13.8 % (ref 12.3–15.4)
HCT VFR BLD AUTO: 39.6 % (ref 34–46.6)
HGB BLD-MCNC: 12.4 G/DL (ref 12–15.9)
IMM GRANULOCYTES # BLD AUTO: 0.04 10*3/MM3 (ref 0–0.05)
IMM GRANULOCYTES NFR BLD AUTO: 0.3 % (ref 0–0.5)
LYMPHOCYTES # BLD AUTO: 1.59 10*3/MM3 (ref 0.7–3.1)
LYMPHOCYTES NFR BLD AUTO: 11.4 % (ref 19.6–45.3)
MCH RBC QN AUTO: 27.9 PG (ref 26.6–33)
MCHC RBC AUTO-ENTMCNC: 31.3 G/DL (ref 31.5–35.7)
MCV RBC AUTO: 89 FL (ref 79–97)
MONOCYTES # BLD AUTO: 1.18 10*3/MM3 (ref 0.1–0.9)
MONOCYTES NFR BLD AUTO: 8.5 % (ref 5–12)
NEUTROPHILS NFR BLD AUTO: 10.62 10*3/MM3 (ref 1.7–7)
NEUTROPHILS NFR BLD AUTO: 76.1 % (ref 42.7–76)
PLATELET # BLD AUTO: 493 10*3/MM3 (ref 140–450)
PMV BLD AUTO: 9.8 FL (ref 6–12)
RBC # BLD AUTO: 4.45 10*6/MM3 (ref 3.77–5.28)
URATE SERPL-MCNC: 9 MG/DL (ref 2.4–5.7)
WBC # BLD AUTO: 13.95 10*3/MM3 (ref 3.4–10.8)

## 2021-07-10 PROCEDURE — 84550 ASSAY OF BLOOD/URIC ACID: CPT | Performed by: EMERGENCY MEDICINE

## 2021-07-10 PROCEDURE — 85025 COMPLETE CBC W/AUTO DIFF WBC: CPT | Performed by: EMERGENCY MEDICINE

## 2021-07-10 PROCEDURE — 99283 EMERGENCY DEPT VISIT LOW MDM: CPT

## 2021-07-10 PROCEDURE — 73630 X-RAY EXAM OF FOOT: CPT

## 2021-07-10 PROCEDURE — 99283 EMERGENCY DEPT VISIT LOW MDM: CPT | Performed by: EMERGENCY MEDICINE

## 2021-07-10 RX ORDER — INDOMETHACIN 50 MG/1
CAPSULE ORAL
Qty: 24 CAPSULE | Refills: 0 | Status: SHIPPED | OUTPATIENT
Start: 2021-07-10 | End: 2022-01-21

## 2021-07-10 RX ORDER — COLCHICINE 0.6 MG/1
TABLET ORAL
Qty: 15 TABLET | Refills: 0 | Status: SHIPPED | OUTPATIENT
Start: 2021-07-10 | End: 2022-01-21

## 2021-07-10 NOTE — ED PROVIDER NOTES
Subjective     History provided by:  Patient    History of Present Illness    · Chief complaint: Foot pain    · Location: Right foot over the first metacarpal phalangeal joint.    · Quality/Severity: Swelling and pain    · Timing/Onset: Darted 4 days ago.    · Modifying Factors: The area is painful to touch and painful to bear weight.    · Associated symptoms: No fever.  No redness.    · Narrative: The patient is a 60-year-old white female complaining of a 4-day history of pain in the first metacarpal phalangeal joint area of the left foot.  She has no history of similar pain and has no history of gout.  She denies any history of trauma.  The patient is unemployed and states she is not on her feet much.    Review of Systems   Constitutional: Negative for activity change, appetite change, chills, diaphoresis, fatigue and fever.   HENT: Negative for congestion, dental problem, ear pain, hearing loss, mouth sores, postnasal drip, rhinorrhea, sinus pressure, sore throat and voice change.    Eyes: Negative for photophobia, pain, discharge, redness and visual disturbance.   Respiratory: Negative for cough, chest tightness, shortness of breath, wheezing and stridor.    Cardiovascular: Negative for chest pain, palpitations and leg swelling.   Gastrointestinal: Negative for abdominal pain, diarrhea, nausea and vomiting.   Genitourinary: Negative for difficulty urinating, dysuria, flank pain, frequency, hematuria and urgency.   Musculoskeletal: Positive for gait problem ( Due to left foot pain) and joint swelling ( Left first metacarpal phalangeal joint). Negative for arthralgias, back pain, myalgias, neck pain and neck stiffness.   Skin: Negative for color change and rash.   Neurological: Negative for dizziness, tremors, seizures, syncope, facial asymmetry, speech difficulty, weakness, light-headedness, numbness and headaches.   Hematological: Negative for adenopathy.   Psychiatric/Behavioral: Negative.  Negative for  "confusion and decreased concentration. The patient is not nervous/anxious.      Past Medical History:   Diagnosis Date   • Acquired hypothyroidism    • Arthritis    • Back pain    • Disease of thyroid gland    • Hyperlipidemia    • Hypertension    • Renal disorder     acute kidney failure      /60   Pulse 91   Temp 99.9 °F (37.7 °C) (Oral)   Resp 16   Ht 160 cm (63\")   Wt 92.5 kg (204 lb)   LMP  (LMP Unknown)   SpO2 96%   BMI 36.14 kg/m²     Past Medical History:   Diagnosis Date   • Acquired hypothyroidism    • Arthritis    • Back pain    • Disease of thyroid gland    • Hyperlipidemia    • Hypertension    • Renal disorder     acute kidney failure        No Known Allergies    Past Surgical History:   Procedure Laterality Date   • APPENDECTOMY     • CERVICAL FUSION     •  SECTION     • CHOLECYSTECTOMY         Family History   Problem Relation Age of Onset   • Alzheimer's disease Mother    • Stroke Mother    • Heart attack Father    • Parkinsonism Sister    • Dementia Sister    • Heart disease Brother    • Stroke Brother    • Parkinsonism Brother    • Breast cancer Neg Hx        Social History     Socioeconomic History   • Marital status:      Spouse name: Not on file   • Number of children: Not on file   • Years of education: Not on file   • Highest education level: Not on file   Tobacco Use   • Smoking status: Never Smoker   • Smokeless tobacco: Never Used   • Tobacco comment: Passive smoke exposure   Substance and Sexual Activity   • Alcohol use: No   • Drug use: No           Objective   Physical Exam  Vitals and nursing note reviewed.   Constitutional:       General: She is not in acute distress.     Appearance: Normal appearance. She is obese. She is not ill-appearing, toxic-appearing or diaphoretic.      Comments: Patient appears healthy in no acute distress.  Review of her vital signs: She is afebrile with a temperature 99.9, respirations normal 16 with a normal room " air oxygen saturation 94%, slightly tachycardic with a heart rate of 113, blood pressure normal 134/74.   HENT:      Head: Normocephalic and atraumatic.   Musculoskeletal:      Comments: The patient has mild swelling over the left foot over the first metatarsal phalangeal joint with exquisite soft tissue tenderness consistent with gout.  There is no bony deformity.  No erythema.  The foot is neurovascular intact.   Skin:     General: Skin is warm and dry.      Capillary Refill: Capillary refill takes less than 2 seconds.      Findings: No bruising, erythema or rash.   Neurological:      General: No focal deficit present.      Mental Status: She is alert and oriented to person, place, and time.      Cranial Nerves: No cranial nerve deficit.      Sensory: No sensory deficit.      Motor: No weakness.   Psychiatric:         Mood and Affect: Mood normal.         Behavior: Behavior normal.         Thought Content: Thought content normal.         Judgment: Judgment normal.         Procedures           ED Course  ED Course as of Jul 10 1651   Sat Jul 10, 2021   1620 Reviewed the patient's test results: X-ray of her left foot shows mild degenerative changes involving the first MTP joint, but no bony erosion or acute osseous abnormalities.  Her uric acid level was markedly elevated at 9.0 supporting a diagnosis of gout for the pain involving her left first MTP joint.  Her white count was elevated at 14 with a left shift, but reviewing her white counts in epic she is chronically has a elevated white count.    [TP]   1623 Is my impression the patient's pain and swelling involving her left first MTP joint is due to acute gouty arthritis.  She will be discharged on colchicine and Indocin.  She has an appointment already scheduled with her PCP for 7/19/2021 which I encouraged her to keep.    [TP]      ED Course User Index  [TP] Robert Alvarez MD                                           MDM  Number of Diagnoses or Management  Options  Acute gout of left foot, unspecified cause: new and requires workup     Amount and/or Complexity of Data Reviewed  Clinical lab tests: reviewed and ordered  Tests in the radiology section of CPT®: reviewed and ordered    Risk of Complications, Morbidity, and/or Mortality  Presenting problems: moderate  Diagnostic procedures: high  Management options: moderate  General comments: My diagnosis for lower extremity pain and injury includes but is not limited to hip fracture, femur fracture, hip dislocation, hip contusion, hip sprain, hip strain, pelvic fracture, knee sprain, patella dislocation, knee dislocation, internal derangement of knee, fractures of the femur, tibia, fibula, ankle, foot and digits, ankle sprain, ankle dislocation, Lisfranc fracture, fracture dislocations of the digits, pulmonary embolism, claudication, peripheral vascular disease, gout, osteoarthritis, rheumatoid arthritis, bursitis, septic joint, poly-rheumatica, polyarthralgia and other inflammatory or infectious disease processes.    Patient Progress  Patient progress: stable      Final diagnoses:   Acute gout of left foot, unspecified cause       ED Disposition  ED Disposition     ED Disposition Condition Comment    Discharge Stable           Ezequiel Petersonu, APRN  [address] [id number] [phone]    Go to   As scheduled on 7/19/2021.         Medication List      New Prescriptions    colchicine 0.6 MG tablet  1 tablet p.o. twice daily until gout pain resolves or diarrhea develops.     indomethacin 50 MG capsule  Commonly known as: INDOCIN  1 tablet p.o. 3 times daily with food until gout pain resolves.        Stop    cyclobenzaprine 10 MG tablet  Commonly known as: FLEXERIL     lisinopril 20 MG tablet  Commonly known as: PRINIVIL,ZESTRIL     meloxicam 7.5 MG tablet  Commonly known as: MOBIC           Where to Get Your Medications      These medications were sent to [facility] [address] [place] [address] [place], [address]  ALIX [place] - [phone]  - [phone]  [address], [place] [address]    Phone: [phone]   · colchicine 0.6 MG tablet  · indomethacin 50 MG capsule         Labs Reviewed   URIC ACID - Abnormal; Notable for the following components:       Result Value    Uric Acid 9.0 (*)     All other components within normal limits   CBC WITH AUTO DIFFERENTIAL - Abnormal; Notable for the following components:    WBC 13.95 (*)     MCHC 31.3 (*)     Platelets 493 (*)     Neutrophil % 76.1 (*)     Lymphocyte % 11.4 (*)     Neutrophils, Absolute 10.62 (*)     Monocytes, Absolute 1.18 (*)     Eosinophils, Absolute 0.45 (*)     All other components within normal limits   CBC AND DIFFERENTIAL    Narrative:     The following orders were created for panel order CBC & Differential.  Procedure                               Abnormality         Status                     ---------                               -----------         ------                     CBC Auto Differential[[id number]]        Abnormal            Final result                 Please view results for these tests on the individual orders.     XR Foot 3+ View Left   Final Result   There is mild degenerative change at the first MTP joint but no erosions are seen. Heel spurs are noted as well.      Signer Name: Luis Huggins MD    Signed: 7/10/2021 4:01 PM    Workstation Name: [id number]     [facility]             Medication List      New Prescriptions    colchicine 0.6 MG tablet  1 tablet p.o. twice daily until gout pain resolves or diarrhea develops.     indomethacin 50 MG capsule  Commonly known as: INDOCIN  1 tablet p.o. 3 times daily with food until gout pain resolves.        Stop    cyclobenzaprine 10 MG tablet  Commonly known as: FLEXERIL     lisinopril 20 MG tablet  Commonly known as: PRINIVIL,ZESTRIL     meloxicam 7.5 MG tablet  Commonly known as: MOBIC           Where to Get Your Medications      These medications were sent  to [facility] - [place] - [address] - [phone]  - [phone]   [address], [place] [address]    Phone: [phone]   · colchicine 0.6 MG tablet  · indomethacin 50 MG capsule              Robert Alvarez MD  07/10/21 9541

## 2023-01-07 ENCOUNTER — APPOINTMENT (OUTPATIENT)
Dept: GENERAL RADIOLOGY | Facility: HOSPITAL | Age: 63
End: 2023-01-07
Payer: MEDICARE

## 2023-01-07 ENCOUNTER — HOSPITAL ENCOUNTER (EMERGENCY)
Facility: HOSPITAL | Age: 63
Discharge: HOME | End: 2023-01-07
Attending: EMERGENCY MEDICINE | Admitting: EMERGENCY MEDICINE
Payer: MEDICARE

## 2023-01-07 VITALS
RESPIRATION RATE: 16 BRPM | OXYGEN SATURATION: 95 % | TEMPERATURE: 99.2 F | DIASTOLIC BLOOD PRESSURE: 78 MMHG | HEART RATE: 95 BPM | SYSTOLIC BLOOD PRESSURE: 123 MMHG

## 2023-01-07 DIAGNOSIS — M77.31 HEEL SPUR, RIGHT: Primary | ICD-10-CM

## 2023-01-07 PROCEDURE — 99282 EMERGENCY DEPT VISIT SF MDM: CPT

## 2023-01-07 PROCEDURE — 73630 X-RAY EXAM OF FOOT: CPT

## 2023-01-07 NOTE — ED PROVIDER NOTES
Subjective   History of Present Illness  History of Present Illness    Chief complaint:  heel pain    Location: Right heel    Quality/Severity: Moderate,    Timing/Onset/Duration: Started today    Modifying Factors: Hurts to bear weight    Associated Symptoms: No numbness, tingling, weakness.  No redness, no fever chills or cough.  No bruising.    Narrative: This 62-year-old white female presents with left heel pain.  She denies any trauma    PCP:  Scarlet Peterson APRN       Review of Systems   Constitutional: Negative for chills and fever.   Musculoskeletal:        Right heel pain   Neurological: Negative for weakness and numbness.        Medication List      ASK your doctor about these medications    atorvastatin 10 MG tablet  Commonly known as: LIPITOR     baclofen 10 MG tablet  Commonly known as: LIORESAL     HYDROcodone-acetaminophen 7.5-325 MG per tablet  Commonly known as: NORCO     levothyroxine 125 MCG tablet  Commonly known as: SYNTHROID, LEVOTHROID     lisinopril-hydrochlorothiazide 10-12.5 MG per tablet  Commonly known as: PRINZIDE,ZESTORETIC     predniSONE 10 MG tablet  Commonly known as: DELTASONE  6 tabs days 1&2, 4 tabs days 3&4, 2 tabs days 5&6, 1 tab days 7&8, 1/2 tab days 9&10 then dc     traZODone 50 MG tablet  Commonly known as: DESYREL            Past Medical History:   Diagnosis Date   • Acquired hypothyroidism    • Arthritis    • Back pain    • Disease of thyroid gland    • Hyperlipidemia    • Hypertension    • Renal disorder     acute kidney failure        No Known Allergies    Past Surgical History:   Procedure Laterality Date   • APPENDECTOMY     • CERVICAL FUSION     •  SECTION     • CHOLECYSTECTOMY         Family History   Problem Relation Age of Onset   • Alzheimer's disease Mother    • Stroke Mother    • Heart attack Father    • Parkinsonism Sister    • Dementia Sister    • Heart disease Brother    • Stroke Brother    • Parkinsonism Brother    • Breast cancer Neg  Hx        Social History     Socioeconomic History   • Marital status:    Tobacco Use   • Smoking status: Never   • Smokeless tobacco: Never   • Tobacco comments:     Passive smoke exposure   Vaping Use   • Vaping Use: Never used   Substance and Sexual Activity   • Alcohol use: No   • Drug use: No   • Sexual activity: Defer           Objective   Physical Exam  Vitals reviewed: The temperature is 99.2 °F, pulse 95, respirations 16, /78, room air pulse ox 95%   Constitutional:       Appearance: Normal appearance.   Musculoskeletal:         General: No swelling or tenderness.      Comments: There is a 2+ dorsalis pedal pulse of the right foot.  Capillary refills less than 2 seconds.  The sensation is intact.  There is a normal range of motion noted.  There is no joint laxity noted.  There is no redness or bruising.  There is no tenderness upon palpation of the heel.   Skin:     General: Skin is warm and dry.      Capillary Refill: Capillary refill takes less than 2 seconds.      Findings: No rash.   Neurological:      Mental Status: She is alert.      Sensory: No sensory deficit.      Motor: No weakness.         Procedures           ED Course      21:13 EST, 01/07/23:  The patient's diagnosis of a heel spur was discussed with her.  The patient should call Dr. Rangel on Monday for follow-up next week.  She should take Motrin or Tylenol as needed as directed for pain the patient should rest.  She should return to the emergency department if there is increased pain, numbness, tingling, weakness, change in color or temperature of the foot, worse in any way at all                                     MDM    Final diagnoses:   Heel spur, right       ED Disposition  ED Disposition     None          No follow-up provider specified.       Medication List      No changes were made to your prescriptions during this visit.          Payam Nazario MD  01/08/23 0135

## 2023-01-08 NOTE — DISCHARGE INSTRUCTIONS
Call Dr. Rangel Monday for a follow-up appointment next week.  Take Motrin or Tylenol as needed as directed for pain.  Return to the emergency department if there is worsening pain, numbness, tingling, weakness, change in color or temperature of the foot, fever, chills, worsening way at all.  The patient should rest

## 2023-02-17 ENCOUNTER — OFFICE VISIT (OUTPATIENT)
Dept: FAMILY MEDICINE CLINIC | Facility: CLINIC | Age: 63
End: 2023-02-17
Payer: MEDICARE

## 2023-02-17 VITALS
WEIGHT: 199 LBS | RESPIRATION RATE: 18 BRPM | DIASTOLIC BLOOD PRESSURE: 62 MMHG | OXYGEN SATURATION: 96 % | SYSTOLIC BLOOD PRESSURE: 100 MMHG | BODY MASS INDEX: 35.26 KG/M2 | HEIGHT: 63 IN | TEMPERATURE: 97.6 F | HEART RATE: 89 BPM

## 2023-02-17 DIAGNOSIS — E03.9 ACQUIRED HYPOTHYROIDISM: Primary | ICD-10-CM

## 2023-02-17 DIAGNOSIS — I35.8 AORTIC HEART MURMUR: ICD-10-CM

## 2023-02-17 DIAGNOSIS — E78.2 MIXED HYPERLIPIDEMIA: ICD-10-CM

## 2023-02-17 DIAGNOSIS — E55.9 VITAMIN D DEFICIENCY: ICD-10-CM

## 2023-02-17 DIAGNOSIS — I10 PRIMARY HYPERTENSION: ICD-10-CM

## 2023-02-17 RX ORDER — BACLOFEN 10 MG/1
10 TABLET ORAL 3 TIMES DAILY
COMMUNITY

## 2023-02-17 RX ORDER — ERGOCALCIFEROL 1.25 MG/1
CAPSULE ORAL
COMMUNITY
Start: 2022-11-09

## 2023-02-18 LAB
25(OH)D3+25(OH)D2 SERPL-MCNC: 35.2 NG/ML (ref 30–100)
ALBUMIN SERPL-MCNC: 4.6 G/DL (ref 3.5–5.2)
ALBUMIN/GLOB SERPL: 1.8 G/DL
ALP SERPL-CCNC: 113 U/L (ref 39–117)
ALT SERPL-CCNC: 15 U/L (ref 1–33)
AST SERPL-CCNC: 14 U/L (ref 1–32)
BILIRUB SERPL-MCNC: 0.3 MG/DL (ref 0–1.2)
BUN SERPL-MCNC: 15 MG/DL (ref 8–23)
BUN/CREAT SERPL: 19.2 (ref 7–25)
CALCIUM SERPL-MCNC: 10.3 MG/DL (ref 8.6–10.5)
CHLORIDE SERPL-SCNC: 98 MMOL/L (ref 98–107)
CHOLEST SERPL-MCNC: 170 MG/DL (ref 0–200)
CO2 SERPL-SCNC: 31.3 MMOL/L (ref 22–29)
CREAT SERPL-MCNC: 0.78 MG/DL (ref 0.57–1)
EGFRCR SERPLBLD CKD-EPI 2021: 86 ML/MIN/1.73
GLOBULIN SER CALC-MCNC: 2.6 GM/DL
GLUCOSE SERPL-MCNC: 80 MG/DL (ref 65–99)
HDLC SERPL-MCNC: 56 MG/DL (ref 40–60)
LDLC SERPL CALC-MCNC: 92 MG/DL (ref 0–100)
POTASSIUM SERPL-SCNC: 5.1 MMOL/L (ref 3.5–5.2)
PROT SERPL-MCNC: 7.2 G/DL (ref 6–8.5)
SODIUM SERPL-SCNC: 139 MMOL/L (ref 136–145)
T4 FREE SERPL-MCNC: 1.73 NG/DL (ref 0.93–1.7)
TRIGL SERPL-MCNC: 127 MG/DL (ref 0–150)
TSH SERPL DL<=0.005 MIU/L-ACNC: 0.22 UIU/ML (ref 0.27–4.2)
VLDLC SERPL CALC-MCNC: 22 MG/DL (ref 5–40)

## 2023-02-20 ENCOUNTER — TELEPHONE (OUTPATIENT)
Dept: FAMILY MEDICINE CLINIC | Facility: CLINIC | Age: 63
End: 2023-02-20
Payer: MEDICARE

## 2023-03-10 RX ORDER — LISINOPRIL AND HYDROCHLOROTHIAZIDE 12.5; 1 MG/1; MG/1
1 TABLET ORAL DAILY
Qty: 30 TABLET | Refills: 3 | Status: SHIPPED | OUTPATIENT
Start: 2023-03-10 | End: 2023-03-31

## 2023-03-10 NOTE — TELEPHONE ENCOUNTER
"  Caller: Noemi Magallon \"Annel\"    Relationship: Self    Best call back number: [phone]    Requested Prescriptions:   Requested Prescriptions     Pending Prescriptions Disp Refills   • lisinopril-hydrochlorothiazide (PRINZIDE,ZESTORETIC) 10-12.5 MG per tablet  3     Sig: Take 1 tablet by mouth Daily.        Pharmacy where request should be sent: [facility] [address] [place] [address] [phone] [facility] [phone]      Additional details provided by patient: HAS 5 DAYS OF MEDS     Does the patient have less than a 3 day supply:  [] Yes  [x] No    Would you like a call back once the refill request has been completed: [] Yes [] No    If the office needs to give you a call back, can they leave a voicemail: [] Yes [] No    Tierra Jenkins Rep   03/10/23 09:09 EST           "

## 2023-03-21 ENCOUNTER — TELEPHONE (OUTPATIENT)
Dept: FAMILY MEDICINE CLINIC | Facility: CLINIC | Age: 63
End: 2023-03-21
Payer: MEDICARE

## 2023-03-21 DIAGNOSIS — M10.9 ACUTE GOUT, UNSPECIFIED CAUSE, UNSPECIFIED SITE: Primary | ICD-10-CM

## 2023-03-21 RX ORDER — COLCHICINE 0.6 MG/1
TABLET ORAL
Qty: 7 TABLET | Refills: 0 | Status: SHIPPED | OUTPATIENT
Start: 2023-03-21 | End: 2023-03-27

## 2023-03-21 NOTE — TELEPHONE ENCOUNTER
"  Caller: Noemi Magallon \"Annel\"    Relationship: Self    Best call back number: [phone]    What medication are you requesting: SOMETHING FOR GOUT    What are your current symptoms: PAIN IN HER FOOT      Have you had these symptoms before:    [x] Yes  [] No    Have you been treated for these symptoms before:   [x] Yes  [] No    If a prescription is needed, what is your preferred pharmacy and phone number: [facility] - [place], [place] - [address] [facility] [phone] [facility] [phone] FX       "

## 2023-03-21 NOTE — TELEPHONE ENCOUNTER
I have sent colchicine and she will take 1.2 mg as a starting dose, but then take 0.6 mg every day after.  Take up to 5 days.  Take until symptoms resolve.    Stop taking atorvastatin while taking this medication.    She also needs an appointment

## 2023-03-27 ENCOUNTER — TELEPHONE (OUTPATIENT)
Dept: FAMILY MEDICINE CLINIC | Facility: CLINIC | Age: 63
End: 2023-03-27

## 2023-03-27 NOTE — TELEPHONE ENCOUNTER
"  Caller: Noemi Magallon \"Annel\"    Relationship: Self    Best call back number: [phone]    What was the call regarding: PATIENT WOULD LIKE TO KNOW IF DR COONEY WOULD LIKE FOR HER TO MAKE AN APPOINTMENT BEFORE OR AFTER HER CARDIOLOGIST APPOINTMENT.    PLEASE CALL.    Do you require a callback: YES  "

## 2023-03-30 ENCOUNTER — OFFICE VISIT (OUTPATIENT)
Dept: CARDIOLOGY | Facility: CLINIC | Age: 63
End: 2023-03-30
Payer: MEDICARE

## 2023-03-30 VITALS
BODY MASS INDEX: 33.38 KG/M2 | WEIGHT: 188.4 LBS | HEIGHT: 63 IN | HEART RATE: 90 BPM | SYSTOLIC BLOOD PRESSURE: 110 MMHG | DIASTOLIC BLOOD PRESSURE: 70 MMHG

## 2023-03-30 DIAGNOSIS — R01.1 HEART MURMUR: Primary | ICD-10-CM

## 2023-03-30 PROCEDURE — 3078F DIAST BP <80 MM HG: CPT | Performed by: INTERNAL MEDICINE

## 2023-03-30 PROCEDURE — 99204 OFFICE O/P NEW MOD 45 MIN: CPT | Performed by: INTERNAL MEDICINE

## 2023-03-30 PROCEDURE — 3074F SYST BP LT 130 MM HG: CPT | Performed by: INTERNAL MEDICINE

## 2023-03-30 PROCEDURE — 93000 ELECTROCARDIOGRAM COMPLETE: CPT | Performed by: INTERNAL MEDICINE

## 2023-03-30 NOTE — PROGRESS NOTES
PATIENTINFORMATION    Date of Office Visit: 2023  Encounter Provider: Navneet Chau MD  Place of Service: [facility] CARDIOLOGY  Patient Name: Noemi Magallon  : 1960    Subjective:     Encounter Date:2023      Patient ID: Noemi Magallon is a 62 y.o. female.    No chief complaint on file.    HPI  Ms. Magallon is a pleasant 61 yo lady who is here for evaluation of heart murmur noticed during office visit with Dr. Schmidt.  She reports chronic exertional shortness of breath that may have worsened over the last few months.  She reports going to the mailbox or climbing up stairs at home makes her short winded but she denies any significant chest discomfort, palpitations.  She may feel lightheaded from time to time.  She denies any orthopnea, PND, extremity swelling.    She denies any current tobacco, recreational drug use or alcohol abuse      ROS  All systems reviewed and negative except as noted in HPI.    Past Medical History:   Diagnosis Date   • Acquired hypothyroidism    • Arthritis    • Back pain    • Disease of thyroid gland    • Hyperlipidemia    • Hypertension    • Renal disorder     acute kidney failure        Past Surgical History:   Procedure Laterality Date   • APPENDECTOMY     • CERVICAL FUSION     •  SECTION     • CHOLECYSTECTOMY         Social History     Socioeconomic History   • Marital status:    Tobacco Use   • Smoking status: Never   • Smokeless tobacco: Never   • Tobacco comments:     Passive smoke exposure   Vaping Use   • Vaping Use: Never used   Substance and Sexual Activity   • Alcohol use: No   • Drug use: No   • Sexual activity: Defer       Family History   Problem Relation Age of Onset   • Alzheimer's disease Mother    • Stroke Mother    • Heart attack Father    • Parkinsonism Sister    • Dementia Sister    • Heart disease Brother    • Stroke Brother    • Parkinsonism Brother    • Breast cancer Neg Hx            ECG 12  "Lead    Date/Time: 3/30/2023 9:48 AM  Performed by: Navneet Chau MD  Authorized by: Navneet Chau MD   Comparison: compared with previous ECG from 11/11/2019  Comparison to previous ECG: Sinus tachycardia resolved on this tracing  Rhythm: sinus rhythm  Rate: normal  Conduction: conduction normal  ST Segments: ST segments normal  T Waves: T waves normal  QRS axis: normal  Other: no other findings    Clinical impression: normal ECG               Objective:     /70   Pulse 90   Ht 160 cm (63\")   Wt 85.5 kg (188 lb 6.4 oz)   LMP  (LMP Unknown)   BMI 33.37 kg/m²  Body mass index is 33.37 kg/m².     Constitutional:       General: Not in acute distress.     Appearance: Well-developed. Not diaphoretic.   Eyes:      Pupils: Pupils are equal, round, and reactive to light.   HENT:      Head: Normocephalic and atraumatic.   Neck:      Thyroid: No thyromegaly.   Pulmonary:      Effort: Pulmonary effort is normal. No respiratory distress.      Breath sounds: Normal breath sounds. No wheezing. No rales.   Chest:      Chest wall: Not tender to palpatation.   Cardiovascular:      Normal rate. Regular rhythm.      Murmurs: There is a harsh midsystolic murmur at the URSB, radiating to the neck.      No gallop.   Pulses:     Intact distal pulses.   Edema:     Peripheral edema absent.   Abdominal:      General: Bowel sounds are normal. There is no distension.      Palpations: Abdomen is soft.      Tenderness: There is no guarding.   Musculoskeletal: Normal range of motion.         General: No deformity.      Cervical back: Normal range of motion and neck supple. Skin:     General: Skin is warm and dry.      Findings: No rash.   Neurological:      Mental Status: Alert and oriented to person, place, and time.      Cranial Nerves: No cranial nerve deficit.      Deep Tendon Reflexes: Reflexes are normal and symmetric.   Psychiatric:         Judgment: Judgment normal.         Review Of Data: I have reviewed " pertinent recent labs, images and documents and pertinent findings included in HPI or assessment below.    Lipid Panel    Lipid Panel 2/17/23   Total Cholesterol 170   Triglycerides 127   HDL Cholesterol 56   VLDL Cholesterol 22   LDL Cholesterol  92      Comments are available for some flowsheets but are not being displayed.             Normal echocardiogram 2019  Most recent thyroid function test suggesting hyperthyroid  Assessment/Plan:           1. Essential hypertension-excellent control with current regimen.  2. Hyperlipidemia-lipid panel at goal on statin.  3. Hypothyroidism-on replacement therapy  4. Heart murmur-findings suggestive of presence of aortic valve stenosis.  Echocardiogram done in 2019 significant for aortic valve sclerosis with transvalvular mean gradient of 7 mmHg.  Repeat echocardiogram  5. Exertional shortness of breath-if echo is not significantly abnormal may consider doing myocardial perfusion study.      Diagnosis and plan of care discussed with patient and verbalized understanding.            Your medication list          Accurate as of March 30, 2023 10:18 AM. If you have any questions, ask your nurse or doctor.            CONTINUE taking these medications      Instructions Last Dose Given Next Dose Due   ASPIRIN 81 PO      Take 81 mg by mouth.       atorvastatin 10 MG tablet  Commonly known as: LIPITOR      Take 1 tablet by mouth Daily.       baclofen 10 MG tablet  Commonly known as: LIORESAL      Take 1 tablet by mouth 3 (Three) Times a Day.       levothyroxine 125 MCG tablet  Commonly known as: SYNTHROID, LEVOTHROID      Take 1 tablet by mouth Daily.       lisinopril-hydrochlorothiazide 10-12.5 MG per tablet  Commonly known as: PRINZIDE,ZESTORETIC      Take 1 tablet by mouth Daily.       vitamin D 1.25 MG (68122 UT) capsule capsule  Commonly known as: ERGOCALCIFEROL                      Navneet Chau MD  03/30/23  10:18 EDT

## 2023-03-31 ENCOUNTER — OFFICE VISIT (OUTPATIENT)
Dept: FAMILY MEDICINE CLINIC | Facility: CLINIC | Age: 63
End: 2023-03-31
Payer: MEDICARE

## 2023-03-31 VITALS
HEART RATE: 98 BPM | SYSTOLIC BLOOD PRESSURE: 94 MMHG | BODY MASS INDEX: 33.98 KG/M2 | DIASTOLIC BLOOD PRESSURE: 66 MMHG | WEIGHT: 191.8 LBS | OXYGEN SATURATION: 96 % | HEIGHT: 63 IN

## 2023-03-31 DIAGNOSIS — I10 PRIMARY HYPERTENSION: Primary | ICD-10-CM

## 2023-03-31 PROCEDURE — 3074F SYST BP LT 130 MM HG: CPT | Performed by: STUDENT IN AN ORGANIZED HEALTH CARE EDUCATION/TRAINING PROGRAM

## 2023-03-31 PROCEDURE — 3078F DIAST BP <80 MM HG: CPT | Performed by: STUDENT IN AN ORGANIZED HEALTH CARE EDUCATION/TRAINING PROGRAM

## 2023-03-31 PROCEDURE — 99213 OFFICE O/P EST LOW 20 MIN: CPT | Performed by: STUDENT IN AN ORGANIZED HEALTH CARE EDUCATION/TRAINING PROGRAM

## 2023-03-31 RX ORDER — LISINOPRIL 5 MG/1
5 TABLET ORAL DAILY
Qty: 30 TABLET | Refills: 0 | Status: SHIPPED | OUTPATIENT
Start: 2023-03-31

## 2023-03-31 NOTE — PROGRESS NOTES
"    Yasmani Schmidt DO  [facility] PRIMARY CARE  [address]  [place] [id number] [phone]    Subjective      Name Noemi Magallon MRN [id number]    1960 AGE/SEX 62 y.o. / female      Chief Complaint Chief Complaint   Patient presents with   • Hypotension     Has been experiencing some dizziness         Visit History for  2023    History of Present Illness  Noemi Magallon is a 62 y.o. female who presented today for Hypotension (Has been experiencing some dizziness)  .    BP this morning was 82/60 and before coming in today was 115/71 and low today in office.  Would like to come off of medication if possible.        Medications and Allergies   Current Outpatient Medications   Medication Instructions   • allopurinol (ZYLOPRIM) 100 mg, Oral, Daily   • ASPIRIN 81 PO 81 mg, Oral   • atorvastatin (LIPITOR) 10 mg, Oral, Daily   • baclofen (LIORESAL) 10 mg, Oral, 3 Times Daily   • colchicine 0.6 MG tablet Take 2 tablets by mouth Daily for 1 day, THEN 1 tablet Daily for 7 days.   • levothyroxine (SYNTHROID, LEVOTHROID) 125 mcg, Oral, Daily   • lisinopril (PRINIVIL,ZESTRIL) 5 mg, Oral, Daily   • vitamin D (ERGOCALCIFEROL) 1.25 MG (25336 UT) capsule capsule No dose, route, or frequency recorded.     No Known Allergies   I have reviewed the above medications and allergies     Objective:      Vitals Vitals:    23 1333   BP: 94/66   Pulse: 98   SpO2: 96%   Weight: 87 kg (191 lb 12.8 oz)   Height: 160 cm (63\")     Body mass index is 33.98 kg/m².    Physical Exam  Vitals reviewed.   Constitutional:       General: She is not in acute distress.     Appearance: She is not ill-appearing.   Cardiovascular:      Rate and Rhythm: Normal rate and regular rhythm.   Pulmonary:      Effort: Pulmonary effort is normal.      Breath sounds: Normal breath sounds.   Neurological:      Mental Status: She is alert.   Psychiatric:         Mood and Affect: Mood normal.         Behavior: Behavior " normal.         Thought Content: Thought content normal.         Judgment: Judgment normal.            Assessment/Plan      Issues Addressed/ Plan  1. Primary hypertension  - Blood pressure continues to improve.  She is going to need medication further decreased today.  Going to decrease to 5 mg.  Needs to take BP daily at night.  She can stop medication if BP remains lower than 120 systolic.   - lisinopril (PRINIVIL,ZESTRIL) 5 MG tablet; Take 1 tablet by mouth Daily.  Dispense: 30 tablet; Refill: 0     There are no Patient Instructions on file for this visit.      Follow up  recommended Return in about 3 months (around 6/30/2023).   - Dragon voice recognition software was utilized to complete this chart.  Every reasonable attempt was made to edit and correct the text, however some incorrect words may remain.   Yasmani Schmidt, DO

## 2023-04-05 ENCOUNTER — OFFICE VISIT (OUTPATIENT)
Dept: FAMILY MEDICINE CLINIC | Facility: CLINIC | Age: 63
End: 2023-04-05
Payer: MEDICARE

## 2023-04-05 VITALS
HEART RATE: 94 BPM | TEMPERATURE: 97.1 F | WEIGHT: 196 LBS | SYSTOLIC BLOOD PRESSURE: 124 MMHG | BODY MASS INDEX: 34.73 KG/M2 | OXYGEN SATURATION: 97 % | DIASTOLIC BLOOD PRESSURE: 84 MMHG | HEIGHT: 63 IN

## 2023-04-05 DIAGNOSIS — M10.9 ACUTE GOUT INVOLVING TOE OF LEFT FOOT, UNSPECIFIED CAUSE: Primary | ICD-10-CM

## 2023-04-05 PROCEDURE — 3079F DIAST BP 80-89 MM HG: CPT | Performed by: STUDENT IN AN ORGANIZED HEALTH CARE EDUCATION/TRAINING PROGRAM

## 2023-04-05 PROCEDURE — 99213 OFFICE O/P EST LOW 20 MIN: CPT | Performed by: STUDENT IN AN ORGANIZED HEALTH CARE EDUCATION/TRAINING PROGRAM

## 2023-04-05 PROCEDURE — 3074F SYST BP LT 130 MM HG: CPT | Performed by: STUDENT IN AN ORGANIZED HEALTH CARE EDUCATION/TRAINING PROGRAM

## 2023-04-05 RX ORDER — ALLOPURINOL 100 MG/1
100 TABLET ORAL DAILY
Qty: 30 TABLET | Refills: 2 | Status: SHIPPED | OUTPATIENT
Start: 2023-04-05

## 2023-04-05 RX ORDER — COLCHICINE 0.6 MG/1
TABLET ORAL
Qty: 9 TABLET | Refills: 0 | Status: SHIPPED | OUTPATIENT
Start: 2023-04-05 | End: 2023-04-13

## 2023-04-05 NOTE — PROGRESS NOTES
"    Yasmani Schmidt DO  [facility] PRIMARY CARE  [facility]  [place] [id number] [phone]    Subjective      Name Noemi Magallon MRN [id number]    1960 AGE/SEX 62 y.o. / female      Chief Complaint Chief Complaint   Patient presents with   • Gout     Left foot          Visit History for  2023    History of Present Illness  Noemi Magallon is a 62 y.o. female who presented today for Gout (Left foot )  Patient states that for the last few days she has been experiencing increasing swelling and pain in the left great toe and under the arch of her foot.  She believes that she is having another flare of gout.  This is typical location for her flares      Medications and Allergies   Current Outpatient Medications   Medication Instructions   • allopurinol (ZYLOPRIM) 100 mg, Oral, Daily   • ASPIRIN 81 PO 81 mg, Oral   • atorvastatin (LIPITOR) 10 mg, Oral, Daily   • baclofen (LIORESAL) 10 mg, Oral, 3 Times Daily   • colchicine 0.6 MG tablet Take 2 tablets by mouth Daily for 1 day, THEN 1 tablet Daily for 7 days.   • levothyroxine (SYNTHROID, LEVOTHROID) 125 mcg, Oral, Daily   • lisinopril (PRINIVIL,ZESTRIL) 5 mg, Oral, Daily   • vitamin D (ERGOCALCIFEROL) 1.25 MG (21101 UT) capsule capsule No dose, route, or frequency recorded.     No Known Allergies   I have reviewed the above medications and allergies     Objective:      Vitals Vitals:    23 1003   BP: 124/84   Pulse: 94   Temp: 97.1 °F (36.2 °C)   SpO2: 97%   Weight: 88.9 kg (196 lb)   Height: 160 cm (62.99\")     Body mass index is 34.73 kg/m².    Physical Exam  Vitals reviewed.   Constitutional:       General: She is not in acute distress.     Appearance: She is not ill-appearing.   Pulmonary:      Effort: Pulmonary effort is normal.   Musculoskeletal:      Comments: Warmth and tenderness appreciated over the left great toe that extends under the medial arch of the foot.  Mild surface erythema appreciated as well.  With " some swelling   Psychiatric:         Mood and Affect: Mood normal.         Behavior: Behavior normal.         Thought Content: Thought content normal.         Judgment: Judgment normal.            Assessment/Plan      Issues Addressed/ Plan  1. Acute gout involving toe of left foot, unspecified cause  - Patient appears to be having a gouty flare.  This is her first since stopping her hydrochlorothiazide.  Possible that she needs to be on preventative medication in the future.  - Has had success in the past with colchicine and patient given instruction on proper use of medication.  - Going to provide patient with allopurinol for prevention of future  - colchicine 0.6 MG tablet; Take 2 tablets by mouth Daily for 1 day, THEN 1 tablet Daily for 7 days.  Dispense: 9 tablet; Refill: 0  - allopurinol (ZYLOPRIM) 100 MG tablet; Take 1 tablet by mouth Daily.  Dispense: 30 tablet; Refill: 2     There are no Patient Instructions on file for this visit.      Follow up  recommended Return in about 1 month (around 5/5/2023) for labs, HTN.   - Dragon voice recognition software was utilized to complete this chart.  Every reasonable attempt was made to edit and correct the text, however some incorrect words may remain.   Yasmani Schmidt, DO

## 2023-04-06 PROBLEM — M10.9 ACUTE GOUT INVOLVING TOE OF LEFT FOOT: Status: ACTIVE | Noted: 2023-04-06

## 2023-04-07 ENCOUNTER — TELEPHONE (OUTPATIENT)
Dept: CARDIOLOGY | Facility: CLINIC | Age: 63
End: 2023-04-07
Payer: MEDICARE

## 2023-04-07 ENCOUNTER — HOSPITAL ENCOUNTER (OUTPATIENT)
Dept: CARDIOLOGY | Facility: HOSPITAL | Age: 63
Discharge: HOME | End: 2023-04-07
Admitting: INTERNAL MEDICINE
Payer: MEDICARE

## 2023-04-07 VITALS
SYSTOLIC BLOOD PRESSURE: 110 MMHG | WEIGHT: 196 LBS | DIASTOLIC BLOOD PRESSURE: 70 MMHG | BODY MASS INDEX: 36.07 KG/M2 | HEIGHT: 62 IN

## 2023-04-07 DIAGNOSIS — R01.1 HEART MURMUR: ICD-10-CM

## 2023-04-07 LAB
AORTIC DIMENSIONLESS INDEX: 0.7 (DI)
BH CV ECHO MEAS - ACS: 1.49 CM
BH CV ECHO MEAS - AO MAX PG: 14.6 MMHG
BH CV ECHO MEAS - AO MEAN PG: 8 MMHG
BH CV ECHO MEAS - AO ROOT DIAM: 2.7 CM
BH CV ECHO MEAS - AO V2 MAX: 191 CM/SEC
BH CV ECHO MEAS - AO V2 VTI: 43 CM
BH CV ECHO MEAS - AVA(I,D): 2.42 CM2
BH CV ECHO MEAS - EDV(CUBED): 46.7 ML
BH CV ECHO MEAS - EDV(MOD-SP2): 86 ML
BH CV ECHO MEAS - EDV(MOD-SP4): 81 ML
BH CV ECHO MEAS - EF(MOD-BP): 74 %
BH CV ECHO MEAS - EF(MOD-SP2): 74.4 %
BH CV ECHO MEAS - EF(MOD-SP4): 75.3 %
BH CV ECHO MEAS - ESV(CUBED): 10.7 ML
BH CV ECHO MEAS - ESV(MOD-SP2): 22 ML
BH CV ECHO MEAS - ESV(MOD-SP4): 20 ML
BH CV ECHO MEAS - FS: 38.7 %
BH CV ECHO MEAS - IVS/LVPW: 1.25 CM
BH CV ECHO MEAS - IVSD: 1 CM
BH CV ECHO MEAS - LAT PEAK E' VEL: 9.4 CM/SEC
BH CV ECHO MEAS - LV DIASTOLIC VOL/BSA (35-75): 42.7 CM2
BH CV ECHO MEAS - LV MASS(C)D: 92.8 GRAMS
BH CV ECHO MEAS - LV MAX PG: 4.8 MMHG
BH CV ECHO MEAS - LV MEAN PG: 3 MMHG
BH CV ECHO MEAS - LV SYSTOLIC VOL/BSA (12-30): 10.6 CM2
BH CV ECHO MEAS - LV V1 MAX: 110 CM/SEC
BH CV ECHO MEAS - LV V1 VTI: 29.1 CM
BH CV ECHO MEAS - LVIDD: 3.6 CM
BH CV ECHO MEAS - LVIDS: 2.21 CM
BH CV ECHO MEAS - LVOT AREA: 3.6 CM2
BH CV ECHO MEAS - LVOT DIAM: 2.13 CM
BH CV ECHO MEAS - LVPWD: 0.8 CM
BH CV ECHO MEAS - MED PEAK E' VEL: 9.9 CM/SEC
BH CV ECHO MEAS - MR MAX PG: 97.6 MMHG
BH CV ECHO MEAS - MR MAX VEL: 494.1 CM/SEC
BH CV ECHO MEAS - MV A MAX VEL: 119 CM/SEC
BH CV ECHO MEAS - MV DEC SLOPE: 505.1 CM/SEC2
BH CV ECHO MEAS - MV DEC TIME: 0.21 MSEC
BH CV ECHO MEAS - MV E MAX VEL: 124 CM/SEC
BH CV ECHO MEAS - MV E/A: 1.04
BH CV ECHO MEAS - MV MAX PG: 7.4 MMHG
BH CV ECHO MEAS - MV MEAN PG: 3 MMHG
BH CV ECHO MEAS - MV P1/2T: 79.1 MSEC
BH CV ECHO MEAS - MV V2 VTI: 36.8 CM
BH CV ECHO MEAS - MVA(P1/2T): 2.8 CM2
BH CV ECHO MEAS - MVA(VTI): 2.8 CM2
BH CV ECHO MEAS - PA V2 MAX: 101.1 CM/SEC
BH CV ECHO MEAS - QP/QS: 0.74
BH CV ECHO MEAS - RAP SYSTOLE: 3 MMHG
BH CV ECHO MEAS - RV MAX PG: 2.18 MMHG
BH CV ECHO MEAS - RV V1 MAX: 73.9 CM/SEC
BH CV ECHO MEAS - RV V1 VTI: 21 CM
BH CV ECHO MEAS - RVOT DIAM: 2.16 CM
BH CV ECHO MEAS - RVSP: 26.5 MMHG
BH CV ECHO MEAS - SI(MOD-SP2): 33.8 ML/M2
BH CV ECHO MEAS - SI(MOD-SP4): 32.2 ML/M2
BH CV ECHO MEAS - SV(LVOT): 104.1 ML
BH CV ECHO MEAS - SV(MOD-SP2): 64 ML
BH CV ECHO MEAS - SV(MOD-SP4): 61 ML
BH CV ECHO MEAS - SV(RVOT): 77.3 ML
BH CV ECHO MEAS - TR MAX PG: 23.5 MMHG
BH CV ECHO MEAS - TR MAX VEL: 242.2 CM/SEC
BH CV ECHO MEASUREMENTS AVERAGE E/E' RATIO: 12.85
BH CV XLRA - RV BASE: 3.6 CM
BH CV XLRA - RV LENGTH: 6.5 CM
BH CV XLRA - RV MID: 3 CM
BH CV XLRA - TDI S': 10.3 CM/SEC
LEFT ATRIUM VOLUME INDEX: 29.2 ML/M2
MAXIMAL PREDICTED HEART RATE: 158 BPM
SINUS: 3.3 CM
STRESS TARGET HR: 134 BPM

## 2023-04-07 PROCEDURE — 93306 TTE W/DOPPLER COMPLETE: CPT

## 2023-04-07 PROCEDURE — 93306 TTE W/DOPPLER COMPLETE: CPT | Performed by: INTERNAL MEDICINE

## 2023-04-07 NOTE — TELEPHONE ENCOUNTER
Discussed results of echo. Patient reports the only time she feels short of breath now is with walking long distances. She would prefer to hold off on proceeding with a stress test unless her symptoms worsen. She has a follow up with her PCP in a few weeks and wants to discuss with him as well.

## 2023-04-10 ENCOUNTER — TELEPHONE (OUTPATIENT)
Dept: FAMILY MEDICINE CLINIC | Facility: CLINIC | Age: 63
End: 2023-04-10
Payer: MEDICARE

## 2023-04-10 RX ORDER — LEVOTHYROXINE SODIUM 0.12 MG/1
125 TABLET ORAL DAILY
Qty: 30 TABLET | Refills: 3 | Status: SHIPPED | OUTPATIENT
Start: 2023-04-10

## 2023-04-10 NOTE — TELEPHONE ENCOUNTER
"Caller: Noemi Magallon \"Annel\"    Relationship: Self    Best call back number: [phone]    Requested Prescriptions:   Requested Prescriptions     Pending Prescriptions Disp Refills   • levothyroxine (SYNTHROID, LEVOTHROID) 125 MCG tablet       Sig: Take 1 tablet by mouth Daily.        Pharmacy where request should be sent: [facility] [address] [place] [address] [address] [facility] [phone] [facility] [phone]      Last office visit with prescribing clinician: 4/5/2023   Last telemedicine visit with prescribing clinician: 5/17/2023   Next office visit with prescribing clinician: 5/17/2023     Additional details provided by patient: PATIENT STATES SHE HAS 4 DAYS LEFT ON THIS PRESCRIPTION.     Does the patient have less than a 3 day supply:  [] Yes  [x] No    Would you like a call back once the refill request has been completed: [] Yes [] No    If the office needs to give you a call back, can they leave a voicemail: [] Yes [] No    Tierra Jones Rep   04/10/23 08:45 EDT             "

## 2023-04-10 NOTE — TELEPHONE ENCOUNTER
"Caller: Noemi Magallon \"Annel\"    Relationship: Self    Best call back number: [phone]    Caller requesting test results: YES     What test was performed: ECHOCARDIOGRAM     When was the test performed: 4/7/23    Where was the test performed: [facility]     Additional notes:   "

## 2023-04-10 NOTE — TELEPHONE ENCOUNTER
"Caller: Noemi Magallon \"Annel\"    Relationship: Self    Best call back number: [phone]    What is the best time to reach you: ANYTIME     Who are you requesting to speak with (clinical staff, provider,  specific staff member): CLINICAL     What was the call regarding: PATIENT WOULD LIKE TO KNOW IF SHE  CAN TAKE HER MEDICATION FOR GOUT (allopurinol (ZYLOPRIM) 100 MG tablet) WITH HER CHOLESTEROL MEDICATION SINCE SHE COULD NOT WITH THE OTHERS.     Do you require a callback: YES   "

## 2023-04-12 NOTE — TELEPHONE ENCOUNTER
You should have a follow-up with cardiology to talk about your results.  They may also want to do further testing if they see something concerning.  I don't see anything overly concerning though.  Please make sure you have a follow-up scheduled with cardiology

## 2023-04-18 ENCOUNTER — OFFICE VISIT (OUTPATIENT)
Dept: FAMILY MEDICINE CLINIC | Facility: CLINIC | Age: 63
End: 2023-04-18
Payer: MEDICARE

## 2023-04-18 VITALS
SYSTOLIC BLOOD PRESSURE: 136 MMHG | WEIGHT: 195.2 LBS | TEMPERATURE: 98.1 F | HEIGHT: 63 IN | OXYGEN SATURATION: 96 % | BODY MASS INDEX: 34.59 KG/M2 | DIASTOLIC BLOOD PRESSURE: 92 MMHG | HEART RATE: 94 BPM | RESPIRATION RATE: 18 BRPM

## 2023-04-18 DIAGNOSIS — G89.29 CHRONIC BILATERAL LOW BACK PAIN WITHOUT SCIATICA: ICD-10-CM

## 2023-04-18 DIAGNOSIS — I10 PRIMARY HYPERTENSION: Primary | ICD-10-CM

## 2023-04-18 DIAGNOSIS — M54.50 CHRONIC BILATERAL LOW BACK PAIN WITHOUT SCIATICA: ICD-10-CM

## 2023-04-18 PROCEDURE — 1159F MED LIST DOCD IN RCRD: CPT | Performed by: STUDENT IN AN ORGANIZED HEALTH CARE EDUCATION/TRAINING PROGRAM

## 2023-04-18 PROCEDURE — 1160F RVW MEDS BY RX/DR IN RCRD: CPT | Performed by: STUDENT IN AN ORGANIZED HEALTH CARE EDUCATION/TRAINING PROGRAM

## 2023-04-18 PROCEDURE — 99214 OFFICE O/P EST MOD 30 MIN: CPT | Performed by: STUDENT IN AN ORGANIZED HEALTH CARE EDUCATION/TRAINING PROGRAM

## 2023-04-18 PROCEDURE — 3080F DIAST BP >= 90 MM HG: CPT | Performed by: STUDENT IN AN ORGANIZED HEALTH CARE EDUCATION/TRAINING PROGRAM

## 2023-04-18 PROCEDURE — 3075F SYST BP GE 130 - 139MM HG: CPT | Performed by: STUDENT IN AN ORGANIZED HEALTH CARE EDUCATION/TRAINING PROGRAM

## 2023-04-18 RX ORDER — HYDROCHLOROTHIAZIDE 12.5 MG/1
12.5 TABLET ORAL DAILY
Qty: 30 TABLET | Refills: 2 | Status: SHIPPED | OUTPATIENT
Start: 2023-04-18

## 2023-04-18 RX ORDER — BACLOFEN 10 MG/1
10 TABLET ORAL 3 TIMES DAILY
Qty: 90 TABLET | Refills: 2 | Status: SHIPPED | OUTPATIENT
Start: 2023-04-18

## 2023-04-18 NOTE — PROGRESS NOTES
"    Yasmani Schmidt DO  [facility] PRIMARY CARE  [address] [place] [address] [address]  [place] [id number] [phone]    Subjective      Name Noemi Magallon MRN [id number]    1960 AGE/SEX 62 y.o. / female      Chief Complaint Chief Complaint   Patient presents with   • Follow-up   • Hypertension     Blood pressure concerns, reports elevated readings at home         Visit History for  2023    History of Present Illness  Noemi Magallon is a 62 y.o. female who presented today for Follow-up and Hypertension (Blood pressure concerns, reports elevated readings at home)  At home she is getting 146/74 and sometimes 152 systolic.     Recently we decreased her lisinopril from 10 mg to 5 mg.  Her past blood pressure has been lower than 110 and she was experiencing symptoms of orthostatic hypotension.    She is now noticing higher blood pressure at home.    Having MRI done on the  for low back and she is going to have follow-up with neurologist as well.       Medications and Allergies   Current Outpatient Medications   Medication Instructions   • allopurinol (ZYLOPRIM) 100 mg, Oral, Daily   • ASPIRIN 81 PO 81 mg, Oral   • atorvastatin (LIPITOR) 10 mg, Oral, Daily   • baclofen (LIORESAL) 10 mg, Oral, 3 Times Daily   • hydroCHLOROthiazide (HYDRODIURIL) 12.5 mg, Oral, Daily   • levothyroxine (SYNTHROID, LEVOTHROID) 125 mcg, Oral, Daily   • lisinopril (PRINIVIL,ZESTRIL) 5 mg, Oral, Daily   • vitamin D (ERGOCALCIFEROL) 1.25 MG (80516 UT) capsule capsule No dose, route, or frequency recorded.     No Known Allergies   I have reviewed the above medications and allergies     Objective:      Vitals Vitals:    23 1001   BP: 136/92   BP Location: Left arm   Patient Position: Sitting   Pulse: 94   Resp: 18   Temp: 98.1 °F (36.7 °C)   TempSrc: Oral   SpO2: 96%   Weight: 88.5 kg (195 lb 3.2 oz)   Height: 160 cm (63\")  Comment: pt reported     Body mass index is 34.58 kg/m².    Physical Exam  Vitals reviewed. "   Constitutional:       General: She is not in acute distress.     Appearance: She is not ill-appearing.   Cardiovascular:      Rate and Rhythm: Normal rate and regular rhythm.   Pulmonary:      Effort: Pulmonary effort is normal.      Breath sounds: Normal breath sounds.   Neurological:      Mental Status: She is alert.   Psychiatric:         Mood and Affect: Mood normal.         Behavior: Behavior normal.         Thought Content: Thought content normal.         Judgment: Judgment normal.            Assessment/Plan      Issues Addressed/ Plan  1. Primary hypertension  - Patient was having low blood pressure with lisinopril dose at 10 mg.  Going to start HCTZ.  She can follow-up with cardiology for further adjustment if needed.  May also need further evaluation for sudden fluctuation in blood pressure.  - hydroCHLOROthiazide (HYDRODIURIL) 12.5 MG tablet; Take 1 tablet by mouth Daily.  Dispense: 30 tablet; Refill: 2    2. Chronic bilateral low back pain without sciatica  -She is needing a refill on medication.  She is continuing with evaluation by neurology.  - baclofen (LIORESAL) 10 MG tablet; Take 1 tablet by mouth 3 (Three) Times a Day.  Dispense: 90 tablet; Refill: 2     There are no Patient Instructions on file for this visit.      Follow up  recommended Return in about 3 months (around 7/18/2023).   - Dragon voice recognition software was utilized to complete this chart.  Every reasonable attempt was made to edit and correct the text, however some incorrect words may remain.   Yasmani Schmidt, DO

## 2023-04-28 DIAGNOSIS — I10 PRIMARY HYPERTENSION: ICD-10-CM

## 2023-04-28 RX ORDER — LISINOPRIL 5 MG/1
5 TABLET ORAL DAILY
Qty: 30 TABLET | Refills: 0 | Status: SHIPPED | OUTPATIENT
Start: 2023-04-28 | End: 2023-05-01

## 2023-05-01 DIAGNOSIS — I10 PRIMARY HYPERTENSION: ICD-10-CM

## 2023-05-01 RX ORDER — LISINOPRIL 5 MG/1
TABLET ORAL
Qty: 30 TABLET | Refills: 0 | Status: SHIPPED | OUTPATIENT
Start: 2023-05-01 | End: 2023-05-05 | Stop reason: SDUPTHER

## 2023-05-02 RX ORDER — ATORVASTATIN CALCIUM 10 MG/1
10 TABLET, FILM COATED ORAL DAILY
Qty: 90 TABLET | Refills: 0 | Status: SHIPPED | OUTPATIENT
Start: 2023-05-02

## 2023-05-02 NOTE — TELEPHONE ENCOUNTER
"Caller: Anali Magallonhleen \"Annel\"    Relationship: Self    Best call back number: [phone]    Requested Prescriptions:   Requested Prescriptions     Pending Prescriptions Disp Refills   • atorvastatin (LIPITOR) 10 MG tablet 90 tablet      Sig: Take 1 tablet by mouth Daily.        Pharmacy where request should be sent: [facility] [address] [place] [address] [phone] [facility] [phone]      Last office visit with prescribing clinician: 4/18/2023   Last telemedicine visit with prescribing clinician: 5/17/2023   Next office visit with prescribing clinician: 5/17/2023     Additional details provided by patient: PATIENT STATES THAT SHE HAS ABOUT 6 TABLETS REMAINING    Does the patient have less than a 3 day supply:  [] Yes  [x] No    Would you like a call back once the refill request has been completed: [] Yes [x] No    If the office needs to give you a call back, can they leave a voicemail: [] Yes [x] No    Tierra Carrera Rep   05/02/23 10:21 EDT           "

## 2023-05-05 DIAGNOSIS — I10 PRIMARY HYPERTENSION: ICD-10-CM

## 2023-05-05 RX ORDER — LISINOPRIL 5 MG/1
5 TABLET ORAL DAILY
Qty: 90 TABLET | Refills: 3 | Status: SHIPPED | OUTPATIENT
Start: 2023-05-05

## 2023-05-08 ENCOUNTER — OFFICE VISIT (OUTPATIENT)
Dept: CARDIOLOGY | Facility: CLINIC | Age: 63
End: 2023-05-08
Payer: MEDICARE

## 2023-05-08 ENCOUNTER — LAB (OUTPATIENT)
Dept: LAB | Facility: HOSPITAL | Age: 63
End: 2023-05-08
Payer: MEDICARE

## 2023-05-08 VITALS
SYSTOLIC BLOOD PRESSURE: 110 MMHG | BODY MASS INDEX: 33.17 KG/M2 | HEIGHT: 63 IN | HEART RATE: 87 BPM | WEIGHT: 187.2 LBS | DIASTOLIC BLOOD PRESSURE: 80 MMHG

## 2023-05-08 DIAGNOSIS — I10 PRIMARY HYPERTENSION: ICD-10-CM

## 2023-05-08 DIAGNOSIS — I35.0 MILD AORTIC VALVE STENOSIS: ICD-10-CM

## 2023-05-08 DIAGNOSIS — I10 PRIMARY HYPERTENSION: Primary | ICD-10-CM

## 2023-05-08 DIAGNOSIS — E03.9 ACQUIRED HYPOTHYROIDISM: ICD-10-CM

## 2023-05-08 DIAGNOSIS — R06.02 EXERTIONAL SHORTNESS OF BREATH: ICD-10-CM

## 2023-05-08 DIAGNOSIS — E78.00 HYPERCHOLESTEROLEMIA: ICD-10-CM

## 2023-05-08 LAB
ANION GAP SERPL CALCULATED.3IONS-SCNC: 10.5 MMOL/L (ref 5–15)
BUN SERPL-MCNC: 22 MG/DL (ref 8–23)
BUN/CREAT SERPL: 25.3 (ref 7–25)
CALCIUM SPEC-SCNC: 9.9 MG/DL (ref 8.6–10.5)
CHLORIDE SERPL-SCNC: 99 MMOL/L (ref 98–107)
CO2 SERPL-SCNC: 28.5 MMOL/L (ref 22–29)
CREAT SERPL-MCNC: 0.87 MG/DL (ref 0.57–1)
EGFRCR SERPLBLD CKD-EPI 2021: 75.4 ML/MIN/1.73
GLUCOSE SERPL-MCNC: 110 MG/DL (ref 65–99)
NT-PROBNP SERPL-MCNC: 37.8 PG/ML (ref 0–900)
POTASSIUM SERPL-SCNC: 4.6 MMOL/L (ref 3.5–5.2)
SODIUM SERPL-SCNC: 138 MMOL/L (ref 136–145)
T4 FREE SERPL-MCNC: 2.01 NG/DL (ref 0.93–1.7)

## 2023-05-08 PROCEDURE — 84439 ASSAY OF FREE THYROXINE: CPT

## 2023-05-08 PROCEDURE — 83880 ASSAY OF NATRIURETIC PEPTIDE: CPT

## 2023-05-08 PROCEDURE — 36415 COLL VENOUS BLD VENIPUNCTURE: CPT

## 2023-05-08 PROCEDURE — 80048 BASIC METABOLIC PNL TOTAL CA: CPT

## 2023-05-08 NOTE — PROGRESS NOTES
PATIENTINFORMATION    Date of Office Visit: 2023  Encounter Provider: Navneet Chau MD  Place of Service: [facility] CARDIOLOGY  Patient Name: Noemi Magallon  : 1960    Subjective:     Encounter Date:2023      Patient ID: Noemi Magallon is a 62 y.o. female.    No chief complaint on file.    HPI  Ms. Magallon is a pleasant 63 years old lady who came to cardiac clinic for follow-up visit.  Continues to report exertional shortness of breath has chronic but easily gets short winded doing simple tasks and not walking long.  She denies any significant chest discomfort, palpitations, orthopnea, or extremity swelling.  She reports occasional lightheadedness but  Denies any  recent fainting.  Compliant with all medications.    Hydrochlorothiazide added by Dr. Hancock in 2023.  Blood pressure runs normal during home monitoring.      ROS  All systems reviewed and negative except as noted in HPI.    Past Medical History:   Diagnosis Date   • Acquired hypothyroidism    • Arthritis    • Asthma     Short of breath for almost a year   • Back pain    • Disease of thyroid gland    • Heart murmur     Found out in February of this year Dr. Gruber told me when i started to go to him in February cause my last Dr. Scarlet Peterson didnt tell mè cause she a quack   • Hyperlipidemia    • Hypertension    • Low back pain     Going on for 4 yrs or longer   • Parkinson's disease     My brother and sister had it.   • Renal disorder     acute kidney failure    • Stroke     My mom had mini strokes       Past Surgical History:   Procedure Laterality Date   • APPENDECTOMY     • CERVICAL FUSION     •  SECTION     • CHOLECYSTECTOMY     • SPINE SURGERY      Had a infusion you have to talk to Dr. Fofana my neurologist at [facility]   • TUBAL ABDOMINAL LIGATION      After my daughter was born.       Social History     Socioeconomic History   • Marital status:    Tobacco Use   • Smoking  "status: Never   • Smokeless tobacco: Never   • Tobacco comments:     Passive smoke exposure   Vaping Use   • Vaping Use: Never used   Substance and Sexual Activity   • Alcohol use: No   • Drug use: No   • Sexual activity: Not Currently       Family History   Problem Relation Age of Onset   • Alzheimer's disease Mother    • Stroke Mother    • Heart attack Father    • Parkinsonism Sister    • Dementia Sister    • Heart disease Brother         My brother Brooks had heart failure and copd   • Stroke Brother    • COPD Brother    • Parkinsonism Brother    • Breast cancer Neg Hx            ECG 12 Lead    Date/Time: 5/8/2023 10:11 AM  Performed by: Navneet Chau MD  Authorized by: Navneet Chau MD   Comparison: compared with previous ECG from 3/30/2023  Similar to previous ECG  Rhythm: sinus rhythm  Rate: normal  Conduction: conduction normal  ST Segments: ST segments normal  T Waves: T waves normal  QRS axis: normal  Other: no other findings    Clinical impression: normal ECG               Objective:     /80   Pulse 87   Ht 160 cm (63\")   Wt 84.9 kg (187 lb 3.2 oz)   LMP  (LMP Unknown)   BMI 33.16 kg/m²  Body mass index is 33.16 kg/m².     Constitutional:       General: Not in acute distress.     Appearance: Well-developed. Not diaphoretic.   Eyes:      Pupils: Pupils are equal, round, and reactive to light.   HENT:      Head: Normocephalic and atraumatic.   Neck:      Thyroid: No thyromegaly.   Pulmonary:      Effort: Pulmonary effort is normal. No respiratory distress.      Breath sounds: Normal breath sounds. No wheezing. No rales.   Chest:      Chest wall: Not tender to palpatation.   Cardiovascular:      Normal rate. Regular rhythm.      Murmurs: There is a harsh midsystolic murmur at the URSB, radiating to the neck.      No gallop.   Pulses:     Intact distal pulses.   Edema:     Peripheral edema absent.   Abdominal:      General: Bowel sounds are normal. There is no distension.      " Palpations: Abdomen is soft.      Tenderness: There is no guarding.   Musculoskeletal: Normal range of motion.         General: No deformity.      Cervical back: Normal range of motion and neck supple. Skin:     General: Skin is warm and dry.      Findings: No rash.   Neurological:      Mental Status: Alert and oriented to person, place, and time.      Cranial Nerves: No cranial nerve deficit.      Deep Tendon Reflexes: Reflexes are normal and symmetric.   Psychiatric:         Judgment: Judgment normal.         Review Of Data: I have reviewed pertinent recent labs, images and documents and pertinent findings included in HPI or assessment below.    Lipid Panel        2/17/2023    13:40   Lipid Panel   Total Cholesterol 170     Triglycerides 127     HDL Cholesterol 56     VLDL Cholesterol 22     LDL Cholesterol  92           Assessment/Plan:         1. Essential hypertension-excellent control with current regimen.  2. Calcified and thickened aortic valve with mild aortic valve stenosis present/mild mitral valve regurgitation-on echocardiogram done on 4/7/2023.  Normal left ventricular systolic and diastolic functions.  3. Hyperlipidemia-lipid panel at goal on statin.  4. Hypothyroidism-on replacement therapy  5. History of exertional shortness of breath    Ms. Magallon with continued exertional shortness of breath.  Echocardiogram without significant findings to explain the shortness of breath.  I will send her for myocardial perfusion study and if that is normal she will start walk regularly with eventual goal to walk for 30 minutes every day.  Otherwise continue current care.  Diagnosis and plan of care discussed with patient and verbalized understanding.            Your medication list          Accurate as of May 8, 2023 10:13 AM. If you have any questions, ask your nurse or doctor.            CONTINUE taking these medications      Instructions Last Dose Given Next Dose Due   allopurinol 100 MG tablet  Commonly known  as: ZYLOPRIM      Take 1 tablet by mouth Daily.       ASPIRIN 81 PO      Take 81 mg by mouth.       atorvastatin 10 MG tablet  Commonly known as: LIPITOR      Take 1 tablet by mouth Daily.       baclofen 10 MG tablet  Commonly known as: LIORESAL      Take 1 tablet by mouth 3 (Three) Times a Day.       hydroCHLOROthiazide 12.5 MG tablet  Commonly known as: HYDRODIURIL      Take 1 tablet by mouth Daily.       levothyroxine 125 MCG tablet  Commonly known as: SYNTHROID, LEVOTHROID      Take 1 tablet by mouth Daily.       lisinopril 5 MG tablet  Commonly known as: PRINIVIL,ZESTRIL      Take 1 tablet by mouth Daily.       vitamin D 1.25 MG (49365 UT) capsule capsule  Commonly known as: ERGOCALCIFEROL                      Navneet Chau MD  05/08/23  10:13 EDT

## 2023-05-10 NOTE — TELEPHONE ENCOUNTER
"Caller: Noemi Magallon \"Annel\"    Relationship: Self    Best call back number: [phone] (Mobile)    Requested Prescriptions:   Requested Prescriptions     Pending Prescriptions Disp Refills   • vitamin D (ERGOCALCIFEROL) 1.25 MG (53501 UT) capsule capsule 5 capsule         Pharmacy where request should be sent: [facility] [address] [place] [address] [address] [facility] [phone] [facility] [phone]      Last office visit with prescribing clinician: 4/18/2023   Last telemedicine visit with prescribing clinician: 5/5/2023   Next office visit with prescribing clinician: 5/17/2023     Additional details provided by patient: PATIENT IS ASKING FOR DR COONEY TO CALL THIS MEDICATION IN TO PHARMACY FOR HER, PATIENT STATES SHE HAS 3 LEFT AND HE HAS NEVER FILLED THIS FOR HER BEFORE, PLEASE ADVISE PATIENT IF THIS CAN BE FILLED ASAP    Does the patient have less than a 3 day supply:  [x] Yes  [] No    Would you like a call back once the refill request has been completed: [x] Yes [] No    If the office needs to give you a call back, can they leave a voicemail: [x] Yes [] No    Tierra Roberts Rep   05/10/23 08:47 EDT       "

## 2023-05-12 ENCOUNTER — HOSPITAL ENCOUNTER (OUTPATIENT)
Dept: NUCLEAR MEDICINE | Facility: HOSPITAL | Age: 63
Discharge: HOME | End: 2023-05-12
Payer: MEDICARE

## 2023-05-12 ENCOUNTER — HOSPITAL ENCOUNTER (OUTPATIENT)
Dept: CARDIOLOGY | Facility: HOSPITAL | Age: 63
Discharge: HOME | End: 2023-05-12
Payer: MEDICARE

## 2023-05-12 DIAGNOSIS — R06.02 EXERTIONAL SHORTNESS OF BREATH: ICD-10-CM

## 2023-05-12 LAB
BH CV REST NUCLEAR ISOTOPE DOSE: 10.5 MCI
BH CV STRESS BP STAGE 1: NORMAL
BH CV STRESS COMMENTS STAGE 1: NORMAL
BH CV STRESS DOSE REGADENOSON STAGE 1: 0.4
BH CV STRESS DURATION MIN STAGE 1: 0
BH CV STRESS DURATION SEC STAGE 1: 30
BH CV STRESS HR STAGE 1: 84
BH CV STRESS NUCLEAR ISOTOPE DOSE: 33.7 MCI
BH CV STRESS O2 STAGE 1: 98
BH CV STRESS PROTOCOL 1: NORMAL
BH CV STRESS RECOVERY BP: NORMAL MMHG
BH CV STRESS RECOVERY HR: 109 BPM
BH CV STRESS RECOVERY O2: 96 %
BH CV STRESS STAGE 1: 1
MAXIMAL PREDICTED HEART RATE: 158 BPM
PERCENT MAX PREDICTED HR: 74.68 %
STRESS BASELINE BP: NORMAL MMHG
STRESS BASELINE HR: 89 BPM
STRESS O2 SAT REST: 95 %
STRESS PERCENT HR: 88 %
STRESS POST ESTIMATED WORKLOAD: 1 METS
STRESS POST EXERCISE DUR SEC: 30 SEC
STRESS POST O2 SAT PEAK: 98 %
STRESS POST PEAK BP: NORMAL MMHG
STRESS POST PEAK HR: 118 BPM
STRESS TARGET HR: 134 BPM

## 2023-05-12 PROCEDURE — 0 TECHNETIUM SESTAMIBI: Performed by: INTERNAL MEDICINE

## 2023-05-12 PROCEDURE — 93017 CV STRESS TEST TRACING ONLY: CPT

## 2023-05-12 PROCEDURE — 78452 HT MUSCLE IMAGE SPECT MULT: CPT

## 2023-05-12 PROCEDURE — A9500 TC99M SESTAMIBI: HCPCS | Performed by: INTERNAL MEDICINE

## 2023-05-12 PROCEDURE — 25010000002 REGADENOSON 0.4 MG/5ML SOLUTION: Performed by: INTERNAL MEDICINE

## 2023-05-12 RX ORDER — REGADENOSON 0.08 MG/ML
0.4 INJECTION, SOLUTION INTRAVENOUS
Status: COMPLETED | OUTPATIENT
Start: 2023-05-12 | End: 2023-05-12

## 2023-05-12 RX ORDER — ERGOCALCIFEROL 1.25 MG/1
50000 CAPSULE ORAL
Qty: 5 CAPSULE | Refills: 2 | Status: SHIPPED | OUTPATIENT
Start: 2023-05-12

## 2023-05-12 RX ADMIN — REGADENOSON 0.4 MG: 0.08 INJECTION, SOLUTION INTRAVENOUS at 08:27

## 2023-05-12 RX ADMIN — TECHNETIUM TC 99M SESTAMIBI 1 DOSE: 1 INJECTION INTRAVENOUS at 07:14

## 2023-05-12 RX ADMIN — TECHNETIUM TC 99M SESTAMIBI 1 DOSE: 1 INJECTION INTRAVENOUS at 08:27

## 2023-05-15 NOTE — PROGRESS NOTES
Please notify Ms. Magallon that stress test does not show significant blockages of her heart arteries.  Please encourage her to walk regularly with eventual goal of 30 minutes every day at least 5 times weekly.  Let me know if she has any questions.  Follow-up in cardiology clinic in 6 months or sooner with any concerning symptoms.  I will copy Chelsea.

## 2023-05-17 ENCOUNTER — OFFICE VISIT (OUTPATIENT)
Dept: FAMILY MEDICINE CLINIC | Facility: CLINIC | Age: 63
End: 2023-05-17

## 2023-05-17 VITALS
OXYGEN SATURATION: 97 % | HEIGHT: 63 IN | BODY MASS INDEX: 33.59 KG/M2 | HEART RATE: 94 BPM | SYSTOLIC BLOOD PRESSURE: 128 MMHG | WEIGHT: 189.6 LBS | DIASTOLIC BLOOD PRESSURE: 82 MMHG | RESPIRATION RATE: 16 BRPM

## 2023-05-17 DIAGNOSIS — E03.9 ACQUIRED HYPOTHYROIDISM: Primary | ICD-10-CM

## 2023-05-17 DIAGNOSIS — I10 PRIMARY HYPERTENSION: ICD-10-CM

## 2023-05-17 PROCEDURE — 3074F SYST BP LT 130 MM HG: CPT | Performed by: STUDENT IN AN ORGANIZED HEALTH CARE EDUCATION/TRAINING PROGRAM

## 2023-05-17 PROCEDURE — 1160F RVW MEDS BY RX/DR IN RCRD: CPT | Performed by: STUDENT IN AN ORGANIZED HEALTH CARE EDUCATION/TRAINING PROGRAM

## 2023-05-17 PROCEDURE — 1159F MED LIST DOCD IN RCRD: CPT | Performed by: STUDENT IN AN ORGANIZED HEALTH CARE EDUCATION/TRAINING PROGRAM

## 2023-05-17 PROCEDURE — 3079F DIAST BP 80-89 MM HG: CPT | Performed by: STUDENT IN AN ORGANIZED HEALTH CARE EDUCATION/TRAINING PROGRAM

## 2023-05-17 PROCEDURE — 99213 OFFICE O/P EST LOW 20 MIN: CPT | Performed by: STUDENT IN AN ORGANIZED HEALTH CARE EDUCATION/TRAINING PROGRAM

## 2023-05-17 RX ORDER — LEVOTHYROXINE SODIUM 112 UG/1
112 TABLET ORAL DAILY
Qty: 30 TABLET | Refills: 0 | Status: SHIPPED | OUTPATIENT
Start: 2023-05-17 | End: 2023-06-02

## 2023-05-17 NOTE — PROGRESS NOTES
"    Yasmani Schmidt DO  [facility] PRIMARY CARE  [address]  [place] [id number] [phone]    Subjective      Name Noemi Magallon MRN [id number]    1960 AGE/SEX 62 y.o. / female      Chief Complaint Chief Complaint   Patient presents with   • Follow-up     F/u labs         Visit History for  2023    History of Present Illness  Noemi Magallon is a 62 y.o. female who presented today for Follow-up (F/u labs)  Stress test was completed.   Not able to afford the vit d and wondering if she needs to continue to take medication.      Taking levothyroxine as directed.  No unwanted side effects.        Medications and Allergies   Current Outpatient Medications   Medication Instructions   • allopurinol (ZYLOPRIM) 100 mg, Oral, Daily   • ASPIRIN 81 PO 81 mg, Oral   • atorvastatin (LIPITOR) 10 mg, Oral, Daily   • baclofen (LIORESAL) 10 mg, Oral, 3 Times Daily   • hydroCHLOROthiazide (HYDRODIURIL) 12.5 mg, Oral, Daily   • levothyroxine (SYNTHROID, LEVOTHROID) 112 mcg, Oral, Daily   • lisinopril (PRINIVIL,ZESTRIL) 5 mg, Oral, Daily     No Known Allergies   I have reviewed the above medications and allergies     Objective:      Vitals Vitals:    23 0952   BP: 128/82   BP Location: Left arm   Patient Position: Sitting   Pulse: 94   Resp: 16   SpO2: 97%   Weight: 86 kg (189 lb 9.6 oz)   Height: 160 cm (63\")     Body mass index is 33.59 kg/m².    Physical Exam  Vitals reviewed.   Constitutional:       General: She is not in acute distress.     Appearance: She is not ill-appearing.   Pulmonary:      Effort: Pulmonary effort is normal.   Psychiatric:         Mood and Affect: Mood normal.         Behavior: Behavior normal.         Thought Content: Thought content normal.         Judgment: Judgment normal.            Assessment/Plan      Issues Addressed/ Plan  1. Acquired hypothyroidism  - Going to decrease dose at this time.  T4 slightly elevated.  Will need to recheck in 3 months for " response.    - levothyroxine (SYNTHROID, LEVOTHROID) 112 MCG tablet; Take 1 tablet by mouth Daily.  Dispense: 30 tablet; Refill: 0    2. Primary hypertension  - Currently stable on medication.  Refill at this time.  No changes to dosing.  Continue to follow with cardiology as well.    - lisinopril (PRINIVIL,ZESTRIL) 5 MG tablet; Take 1 tablet by mouth Daily.  Dispense: 90 tablet; Refill: 3     There are no Patient Instructions on file for this visit.      Follow up  recommended Return in about 3 months (around 8/17/2023) for Medicare Wellness.   - Dragon voice recognition software was utilized to complete this chart.  Every reasonable attempt was made to edit and correct the text, however some incorrect words may remain.   Yasmani Schmidt, DO

## 2023-05-22 ENCOUNTER — TELEPHONE (OUTPATIENT)
Dept: FAMILY MEDICINE CLINIC | Facility: CLINIC | Age: 63
End: 2023-05-22

## 2023-05-22 NOTE — TELEPHONE ENCOUNTER
"  Caller: Noemi Magallon \"Annel\"    Relationship: Self    Best call back number: *  Naun Noemi \"Annel\" (Self) [phone] (Mobile)         What was the call regarding:  PATIENT HAD TO CANCEL THE RX FOR LISINOPRIL BECAUSE SHE DOES NOT USE [facility]     SHE USES [facility]     Do you require a callback: IF NEEDED      "

## 2023-06-02 DIAGNOSIS — E03.9 ACQUIRED HYPOTHYROIDISM: ICD-10-CM

## 2023-06-02 RX ORDER — LEVOTHYROXINE SODIUM 112 UG/1
TABLET ORAL
Qty: 30 TABLET | Refills: 0 | Status: SHIPPED | OUTPATIENT
Start: 2023-06-02

## 2023-06-02 RX ORDER — LISINOPRIL 5 MG/1
5 TABLET ORAL DAILY
Qty: 90 TABLET | Refills: 3 | Status: SHIPPED | OUTPATIENT
Start: 2023-06-02

## 2023-07-23 DIAGNOSIS — M10.9 ACUTE GOUT INVOLVING TOE OF LEFT FOOT, UNSPECIFIED CAUSE: ICD-10-CM

## 2023-07-24 RX ORDER — ALLOPURINOL 100 MG/1
TABLET ORAL
Qty: 30 TABLET | Refills: 0 | Status: SHIPPED | OUTPATIENT
Start: 2023-07-24 | End: 2023-07-25 | Stop reason: SDUPTHER

## 2023-07-25 ENCOUNTER — OFFICE VISIT (OUTPATIENT)
Dept: FAMILY MEDICINE CLINIC | Facility: CLINIC | Age: 63
End: 2023-07-25
Payer: MEDICARE

## 2023-07-25 VITALS
SYSTOLIC BLOOD PRESSURE: 118 MMHG | RESPIRATION RATE: 18 BRPM | HEIGHT: 63 IN | OXYGEN SATURATION: 98 % | DIASTOLIC BLOOD PRESSURE: 72 MMHG | HEART RATE: 93 BPM | WEIGHT: 175.8 LBS | BODY MASS INDEX: 31.15 KG/M2

## 2023-07-25 DIAGNOSIS — M10.9 ACUTE GOUT INVOLVING TOE OF LEFT FOOT, UNSPECIFIED CAUSE: Primary | ICD-10-CM

## 2023-07-25 DIAGNOSIS — R53.83 OTHER FATIGUE: ICD-10-CM

## 2023-07-25 DIAGNOSIS — Z59.00 HOMELESS: ICD-10-CM

## 2023-07-25 PROCEDURE — 3078F DIAST BP <80 MM HG: CPT | Performed by: STUDENT IN AN ORGANIZED HEALTH CARE EDUCATION/TRAINING PROGRAM

## 2023-07-25 PROCEDURE — 3074F SYST BP LT 130 MM HG: CPT | Performed by: STUDENT IN AN ORGANIZED HEALTH CARE EDUCATION/TRAINING PROGRAM

## 2023-07-25 PROCEDURE — 1159F MED LIST DOCD IN RCRD: CPT | Performed by: STUDENT IN AN ORGANIZED HEALTH CARE EDUCATION/TRAINING PROGRAM

## 2023-07-25 PROCEDURE — 1160F RVW MEDS BY RX/DR IN RCRD: CPT | Performed by: STUDENT IN AN ORGANIZED HEALTH CARE EDUCATION/TRAINING PROGRAM

## 2023-07-25 PROCEDURE — 99213 OFFICE O/P EST LOW 20 MIN: CPT | Performed by: STUDENT IN AN ORGANIZED HEALTH CARE EDUCATION/TRAINING PROGRAM

## 2023-07-25 RX ORDER — ALLOPURINOL 200 MG/1
200 TABLET ORAL DAILY
Qty: 30 TABLET | Refills: 2
Start: 2023-07-25

## 2023-07-25 SDOH — ECONOMIC STABILITY - HOUSING INSECURITY: HOMELESSNESS UNSPECIFIED: Z59.00

## 2023-07-25 NOTE — PROGRESS NOTES
Yasmani Schmidt,   [facility] PRIMARY CARE  [facility]  [place] [id number] [phone]    Subjective      Name Noemi Magallon MRN [id number]    1960 AGE/SEX 62 y.o. / female      Chief Complaint Chief Complaint   Patient presents with    Follow-up     Patient recently seen in in ED for dizzy spells, shortness of breath and numbness in left leg          Visit History for  2023    History of Present Illness  Noemi Magallon is a 62 y.o. female who presented today for Follow-up (Patient recently seen in in ED for dizzy spells, shortness of breath and numbness in left leg )  .    Was down in [place] to try to get in a shelter and could not get on top bunk.  Has been sleeping outside of library.      Has been able to keep medication filled and has been taking.  Did go 5 days without thyroid medication, but was able to get filled.      Has been getting dizzy spells and shortness of breath.  Having pain in legs as well.     Has been eating alright and has food stamps.      Has been having increases in gout attacks recently.  Still taking alopurinol but at 100 mg.      Has no other family in the area that are willing to help her at this time.  She has reached out to local ministers in the past in the area and states that she has burned bridges there as well.        Medications and Allergies   Current Outpatient Medications   Medication Instructions    Allopurinol 200 mg, Oral, Daily    ASPIRIN 81 PO 81 mg, Oral    atorvastatin (LIPITOR) 10 mg, Oral, Daily    baclofen (LIORESAL) 10 mg, Oral, 3 Times Daily    hydroCHLOROthiazide (HYDRODIURIL) 12.5 mg, Oral, Daily    levothyroxine (SYNTHROID, LEVOTHROID) 112 mcg, Oral, Daily     No Known Allergies   I have reviewed the above medications and allergies     Objective:      Vitals Vitals:    23 0853   BP: 118/72   BP Location: Right arm   Patient Position: Sitting   Cuff Size: Adult   Pulse: 93   Resp: 18   SpO2: 98%  "  Weight: 79.7 kg (175 lb 12.8 oz)   Height: 160 cm (63\")     Body mass index is 31.14 kg/mý.    Physical Exam  Vitals reviewed.   Constitutional:       General: She is not in acute distress.     Appearance: She is not ill-appearing.   Cardiovascular:      Rate and Rhythm: Normal rate and regular rhythm.      Heart sounds: Murmur heard.   Pulmonary:      Effort: Pulmonary effort is normal.      Breath sounds: Normal breath sounds.   Neurological:      Mental Status: She is alert.   Psychiatric:         Mood and Affect: Mood normal.         Behavior: Behavior normal.         Thought Content: Thought content normal.         Judgment: Judgment normal.          Assessment/Plan      Issues Addressed/ Plan  1. Acute gout involving toe of left foot, unspecified cause  -Going to increase patient allopurinol from 100 mg to 200 mg in order to prevent any further flare.  May need to check uric acid levels in the future.  - allopurinol 200 MG tablet; Take 200 mg by mouth Daily.  Dispense: 30 tablet; Refill: 2    2. Homeless  -Patient was evicted from her apartment after being unable to pay rent.  Most of her furniture is still at her home.  She is living out of a suitcase, does have medication, does have food with food stamps, and has been unable to stay at a shelter due to inability to sleep on top bunk's and was tired of staying in [place].  She has been sleeping outside the library as she feels this is safe.  Stays inside library during the day.  - Ambulatory Referral to Social Care Services (Amb Case Mgmt)    3. Other fatigue  -Most likely secondary to lack of sleep as patient is sleeping outside.     There are no Patient Instructions on file for this visit.      Follow up  recommended Return in about 1 month (around 8/25/2023) for Recheck.   - Dragon voice recognition software was utilized to complete this chart.  Every reasonable attempt was made to edit and correct the text, however some incorrect words may remain. "   Yasmani Schmidt, DO

## 2023-08-14 DIAGNOSIS — E03.9 ACQUIRED HYPOTHYROIDISM: ICD-10-CM

## 2023-08-14 RX ORDER — LEVOTHYROXINE SODIUM 112 UG/1
112 TABLET ORAL DAILY
Qty: 30 TABLET | Refills: 2 | Status: SHIPPED | OUTPATIENT
Start: 2023-08-14

## 2023-08-16 ENCOUNTER — REFERRAL TRIAGE (OUTPATIENT)
Dept: CASE MANAGEMENT | Facility: CLINIC | Age: 63
End: 2023-08-16
Payer: MEDICARE

## 2023-08-17 ENCOUNTER — OFFICE VISIT (OUTPATIENT)
Dept: FAMILY MEDICINE CLINIC | Facility: CLINIC | Age: 63
End: 2023-08-17
Payer: MEDICARE

## 2023-08-17 VITALS
DIASTOLIC BLOOD PRESSURE: 92 MMHG | WEIGHT: 174 LBS | BODY MASS INDEX: 30.83 KG/M2 | HEART RATE: 88 BPM | OXYGEN SATURATION: 97 % | SYSTOLIC BLOOD PRESSURE: 138 MMHG | HEIGHT: 63 IN | RESPIRATION RATE: 18 BRPM

## 2023-08-17 DIAGNOSIS — M10.9 ACUTE GOUT INVOLVING TOE OF LEFT FOOT, UNSPECIFIED CAUSE: ICD-10-CM

## 2023-08-17 DIAGNOSIS — Z00.00 ENCOUNTER FOR SUBSEQUENT ANNUAL WELLNESS VISIT (AWV) IN MEDICARE PATIENT: Primary | ICD-10-CM

## 2023-08-17 PROCEDURE — G0439 PPPS, SUBSEQ VISIT: HCPCS | Performed by: STUDENT IN AN ORGANIZED HEALTH CARE EDUCATION/TRAINING PROGRAM

## 2023-08-17 PROCEDURE — 3080F DIAST BP >= 90 MM HG: CPT | Performed by: STUDENT IN AN ORGANIZED HEALTH CARE EDUCATION/TRAINING PROGRAM

## 2023-08-17 PROCEDURE — 1160F RVW MEDS BY RX/DR IN RCRD: CPT | Performed by: STUDENT IN AN ORGANIZED HEALTH CARE EDUCATION/TRAINING PROGRAM

## 2023-08-17 PROCEDURE — 1159F MED LIST DOCD IN RCRD: CPT | Performed by: STUDENT IN AN ORGANIZED HEALTH CARE EDUCATION/TRAINING PROGRAM

## 2023-08-17 PROCEDURE — 3075F SYST BP GE 130 - 139MM HG: CPT | Performed by: STUDENT IN AN ORGANIZED HEALTH CARE EDUCATION/TRAINING PROGRAM

## 2023-08-17 RX ORDER — COLCHICINE 0.6 MG/1
CAPSULE ORAL
Qty: 7 CAPSULE | Refills: 0 | Status: SHIPPED | OUTPATIENT
Start: 2023-08-17 | End: 2023-08-23

## 2023-08-17 NOTE — PROGRESS NOTES
The ABCs of the Annual Wellness Visit  Subsequent Medicare Wellness Visit    Chief Complaint   Patient presents with    Medicare Wellness-subsequent     Her for AWV.still having issues with gout       Subjective    History of Present Illness:  Noemi Magallon is a 62 y.o. female who presents for a Subsequent Medicare Wellness Visit.    The following portions of the patient's history were reviewed and   updated as appropriate: allergies, current medications, past family history, past medical history, past social history, past surgical history, and problem list.    Compared to one year ago, the patient feels her physical   health is the same.    Compared to one year ago, the patient feels her mental   health is the same.    Recent Hospitalizations:  She was not admitted to the hospital during the last year.       Current Medical Providers:  Patient Care Team:  Yasmani Schmidt DO as PCP - General (Family Medicine)    Outpatient Medications Prior to Visit   Medication Sig Dispense Refill    allopurinol 200 MG tablet Take 200 mg by mouth Daily. 30 tablet 2    ASPIRIN 81 PO Take 81 mg by mouth.      atorvastatin (LIPITOR) 10 MG tablet Take 1 tablet by mouth Daily. 90 tablet 0    baclofen (LIORESAL) 10 MG tablet Take 1 tablet by mouth 3 (Three) Times a Day. 90 tablet 2    hydroCHLOROthiazide (HYDRODIURIL) 12.5 MG tablet Take 1 tablet by mouth Daily. 90 tablet 1    levothyroxine (SYNTHROID, LEVOTHROID) 112 MCG tablet Take 1 tablet by mouth Daily. 30 tablet 2     No facility-administered medications prior to visit.       No opioid medication identified on active medication list. I have reviewed chart for other potential  high risk medication/s and harmful drug interactions in the elderly.        Aspirin is on active medication list. Aspirin use is indicated based on review of current medical condition/s. Pros and cons of this therapy have been discussed today. Benefits of this medication outweigh potential harm.  Patient has  "been encouraged to continue taking this medication.  .      Patient Active Problem List   Diagnosis    HTN (hypertension)    Cervical spondylosis    Thoracic back pain    Acquired hypothyroidism    Acute gout involving toe of left foot    Mild aortic valve stenosis    Hypercholesterolemia     Advance Care Planning  Advance Directive is on file.  ACP discussion was held with the patient during this visit. Patient has an advance directive in EMR which is still valid.           Objective    Vitals:    08/17/23 1034   BP: 138/92   BP Location: Right arm   Patient Position: Sitting   Cuff Size: Adult   Pulse: 88   Resp: 18   SpO2: 97%   Weight: 78.9 kg (174 lb)   Height: 160 cm (63\")     Estimated body mass index is 30.82 kg/m² as calculated from the following:    Height as of this encounter: 160 cm (63\").    Weight as of this encounter: 78.9 kg (174 lb).    BMI is >= 30 and <35. (Class 1 Obesity). The following options were offered after discussion;: exercise counseling/recommendations and nutrition counseling/recommendations      Does the patient have evidence of cognitive impairment? No    Physical Exam  Vitals reviewed.   Constitutional:       General: She is not in acute distress.     Appearance: She is not ill-appearing.   Cardiovascular:      Rate and Rhythm: Normal rate and regular rhythm.   Pulmonary:      Effort: Pulmonary effort is normal.      Breath sounds: Normal breath sounds.   Neurological:      Mental Status: She is alert.   Psychiatric:         Mood and Affect: Mood normal.         Behavior: Behavior normal.         Thought Content: Thought content normal.         Judgment: Judgment normal.               HEALTH RISK ASSESSMENT    Smoking Status:  Social History     Tobacco Use   Smoking Status Never   Smokeless Tobacco Never   Tobacco Comments    Passive smoke exposure     Alcohol Consumption:  Social History     Substance and Sexual Activity   Alcohol Use No     Fall Risk Screen:    STEADI Fall Risk " Assessment was completed, and patient is at LOW risk for falls.Assessment completed on:2023    Depression Screenin/17/2023    10:39 AM   PHQ-2/PHQ-9 Depression Screening   Little Interest or Pleasure in Doing Things 0-->not at all   Feeling Down, Depressed or Hopeless 0-->not at all   PHQ-9: Brief Depression Severity Measure Score 0       Health Habits and Functional and Cognitive Screenin/17/2023    10:36 AM   Functional & Cognitive Status   Do you have difficulty preparing food and eating? No   Do you have difficulty bathing yourself, getting dressed or grooming yourself? No   Do you have difficulty using the toilet? No   Do you have difficulty moving around from place to place? No   Do you have trouble with steps or getting out of a bed or a chair? No   Current Diet Well Balanced Diet   Dental Exam Other   Eye Exam Up to date   Exercise (times per week) 7 times per week   Current Exercises Include Walking   Do you need help using the phone?  No   Are you deaf or do you have serious difficulty hearing?  No   Do you need help to go to places out of walking distance? No   Do you need help shopping? No   Do you need help preparing meals?  No   Do you need help with housework?  No   Do you need help with laundry? No   Do you need help taking your medications? No   Do you need help managing money? No   Do you ever drive or ride in a car without wearing a seat belt? No   Have you felt unusual stress, anger or loneliness in the last month? Yes   Who do you live with? Other   If you need help, do you have trouble finding someone available to you? Yes   Have you been bothered in the last four weeks by sexual problems? No   Do you have difficulty concentrating, remembering or making decisions? No       Age-appropriate Screening Schedule:  Refer to the list below for future screening recommendations based on patient's age, sex and/or medical conditions. Orders for these recommended tests are listed in  the plan section. The patient has been provided with a written plan.    Health Maintenance   Topic Date Due    BMI FOLLOWUP  Never done    COVID-19 Vaccine (1) Never done    TDAP/TD VACCINES (1 - Tdap) Never done    ZOSTER VACCINE (1 of 2) Never done    HEPATITIS C SCREENING  Never done    INFLUENZA VACCINE  10/01/2023    MAMMOGRAM  10/27/2023    LIPID PANEL  02/17/2024    ANNUAL WELLNESS VISIT  08/17/2024    COLORECTAL CANCER SCREENING  02/01/2025    Pneumococcal Vaccine 0-64  Aged Out    PAP SMEAR  Discontinued              Assessment & Plan   CMS Preventative Services Quick Reference  Risk Factors Identified During Encounter  Inadequate Social Support, Isolation, Loneliness, Lack of Transportation, Financial Difficulties, or Caregiver Stress: She was homeless for a time, but is now living with a nephew.    The above risks/problems have been discussed with the patient.  Follow up actions/plans if indicated are seen below in the Assessment/Plan Section.  Pertinent information has been shared with the patient in the After Visit Summary.    Diagnoses and all orders for this visit:    1. Encounter for subsequent annual wellness visit (AWV) in Medicare patient (Primary)    2. Acute gout involving toe of left foot, unspecified cause  -     colchicine 0.6 MG capsule capsule; Take 2 capsules by mouth Daily for 1 day, THEN 1 capsule Daily for 5 days.  Dispense: 7 capsule; Refill: 0    -Although she has been under some recent stress, her condition has improved and now has proper housing.  We will continue to follow up to make sure that she is doing well.  - Needing medication refill only and not addressed today in office.      Follow Up:   Return in about 3 months (around 11/17/2023) for Blood Pressure.     An After Visit Summary and PPPS were made available to the patient.

## 2023-08-18 ENCOUNTER — PATIENT OUTREACH (OUTPATIENT)
Dept: CASE MANAGEMENT | Facility: CLINIC | Age: 63
End: 2023-08-18
Payer: MEDICARE

## 2023-08-18 NOTE — OUTREACH NOTE
"Social Work Assessment  Questions/Answers      Flowsheet Row Most Recent Value   Referral Source outpatient staff, outpatient clinic, patient   Reason for Consult housing concerns/homeless   Preferred Language English   People in Home alone   Current Living Arrangements condominium   Potentially Unsafe Housing Conditions none   Primary Care Provided by self   Provides Primary Care For no one   Employment Status disabled   Source of Income disability, social security          SDOH updated and reviewed with the patient during this program:  Financial Resource Strain: High Risk    Difficulty of Paying Living Expenses: Hard      Food Insecurity: No Food Insecurity    Worried About Running Out of Food in the Last Year: Never true    Ran Out of Food in the Last Year: Never true      Transportation Needs: Not on file      Housing Stability: High Risk    Unable to Pay for Housing in the Last Year: Yes    Number of Places Lived in the Last Year: 2    Unstable Housing in the Last Year: Yes      Patient Outreach    SW received call via PCP re: housing concerns. SW called and spoke to patient via telephone. Patient currently staying with friend is reports can stay \"as long as she needs\". Patient denies food insecurity, at this time. SW attempted to provide resources to [facility] and additional resources. Patient reports. \"They wont help me\" and ended call. SW attempted to call patient back, but patient declined phone call. SW to discharge. Please re consult SW if additional needs arise.     Continuing Care   Community & DME   [facility]    [address] [place] [address]    Phone: [phone]    Resource for: Transportation Needs     Melanie HECTOR -   Ambulatory Case Management    8/18/2023, 09:03 EDT    "

## 2023-09-14 ENCOUNTER — HOSPITAL ENCOUNTER (EMERGENCY)
Facility: HOSPITAL | Age: 63
Discharge: HOME | End: 2023-09-14
Attending: EMERGENCY MEDICINE
Payer: MEDICARE

## 2023-09-14 ENCOUNTER — APPOINTMENT (OUTPATIENT)
Dept: GENERAL RADIOLOGY | Facility: HOSPITAL | Age: 63
End: 2023-09-14
Payer: MEDICARE

## 2023-09-14 VITALS
WEIGHT: 179 LBS | RESPIRATION RATE: 18 BRPM | HEIGHT: 63 IN | DIASTOLIC BLOOD PRESSURE: 76 MMHG | BODY MASS INDEX: 31.71 KG/M2 | TEMPERATURE: 97.7 F | SYSTOLIC BLOOD PRESSURE: 122 MMHG | HEART RATE: 76 BPM | OXYGEN SATURATION: 96 %

## 2023-09-14 DIAGNOSIS — R07.9 NONSPECIFIC CHEST PAIN: Primary | ICD-10-CM

## 2023-09-14 DIAGNOSIS — Z59.812 HOUSING INSTABILITY AFTER RECENT HOMELESSNESS: ICD-10-CM

## 2023-09-14 LAB
ALBUMIN SERPL-MCNC: 4.3 G/DL (ref 3.5–5.2)
ALBUMIN/GLOB SERPL: 1.3 G/DL
ALP SERPL-CCNC: 124 U/L (ref 39–117)
ALT SERPL W P-5'-P-CCNC: 17 U/L (ref 1–33)
ANION GAP SERPL CALCULATED.3IONS-SCNC: 13.3 MMOL/L (ref 5–15)
AST SERPL-CCNC: 22 U/L (ref 1–32)
BASOPHILS # BLD AUTO: 0.1 10*3/MM3 (ref 0–0.2)
BASOPHILS NFR BLD AUTO: 0.7 % (ref 0–1.5)
BILIRUB SERPL-MCNC: 0.4 MG/DL (ref 0–1.2)
BUN SERPL-MCNC: 11 MG/DL (ref 8–23)
BUN/CREAT SERPL: 14.3 (ref 7–25)
CALCIUM SPEC-SCNC: 9.6 MG/DL (ref 8.6–10.5)
CHLORIDE SERPL-SCNC: 93 MMOL/L (ref 98–107)
CO2 SERPL-SCNC: 26.7 MMOL/L (ref 22–29)
CREAT SERPL-MCNC: 0.77 MG/DL (ref 0.57–1)
DEPRECATED RDW RBC AUTO: 45.3 FL (ref 37–54)
EGFRCR SERPLBLD CKD-EPI 2021: 87.3 ML/MIN/1.73
EOSINOPHIL # BLD AUTO: 0.44 10*3/MM3 (ref 0–0.4)
EOSINOPHIL NFR BLD AUTO: 3.2 % (ref 0.3–6.2)
ERYTHROCYTE [DISTWIDTH] IN BLOOD BY AUTOMATED COUNT: 14.2 % (ref 12.3–15.4)
GLOBULIN UR ELPH-MCNC: 3.3 GM/DL
GLUCOSE SERPL-MCNC: 108 MG/DL (ref 65–99)
HCT VFR BLD AUTO: 43.3 % (ref 34–46.6)
HGB BLD-MCNC: 13.7 G/DL (ref 12–15.9)
IMM GRANULOCYTES # BLD AUTO: 0.05 10*3/MM3 (ref 0–0.05)
IMM GRANULOCYTES NFR BLD AUTO: 0.4 % (ref 0–0.5)
LIPASE SERPL-CCNC: 23 U/L (ref 13–60)
LYMPHOCYTES # BLD AUTO: 2.04 10*3/MM3 (ref 0.7–3.1)
LYMPHOCYTES NFR BLD AUTO: 14.7 % (ref 19.6–45.3)
MCH RBC QN AUTO: 28.2 PG (ref 26.6–33)
MCHC RBC AUTO-ENTMCNC: 31.6 G/DL (ref 31.5–35.7)
MCV RBC AUTO: 89.1 FL (ref 79–97)
MONOCYTES # BLD AUTO: 1.17 10*3/MM3 (ref 0.1–0.9)
MONOCYTES NFR BLD AUTO: 8.4 % (ref 5–12)
NEUTROPHILS NFR BLD AUTO: 10.08 10*3/MM3 (ref 1.7–7)
NEUTROPHILS NFR BLD AUTO: 72.6 % (ref 42.7–76)
NRBC BLD AUTO-RTO: 0 /100 WBC (ref 0–0.2)
PLATELET # BLD AUTO: 545 10*3/MM3 (ref 140–450)
PMV BLD AUTO: 10.7 FL (ref 6–12)
POTASSIUM SERPL-SCNC: 3.4 MMOL/L (ref 3.5–5.2)
PROT SERPL-MCNC: 7.6 G/DL (ref 6–8.5)
RBC # BLD AUTO: 4.86 10*6/MM3 (ref 3.77–5.28)
SODIUM SERPL-SCNC: 133 MMOL/L (ref 136–145)
TROPONIN T SERPL HS-MCNC: 6 NG/L
WBC NRBC COR # BLD: 13.88 10*3/MM3 (ref 3.4–10.8)

## 2023-09-14 PROCEDURE — 93005 ELECTROCARDIOGRAM TRACING: CPT | Performed by: EMERGENCY MEDICINE

## 2023-09-14 PROCEDURE — 99284 EMERGENCY DEPT VISIT MOD MDM: CPT

## 2023-09-14 PROCEDURE — 80053 COMPREHEN METABOLIC PANEL: CPT | Performed by: EMERGENCY MEDICINE

## 2023-09-14 PROCEDURE — 84484 ASSAY OF TROPONIN QUANT: CPT | Performed by: EMERGENCY MEDICINE

## 2023-09-14 PROCEDURE — 85025 COMPLETE CBC W/AUTO DIFF WBC: CPT | Performed by: EMERGENCY MEDICINE

## 2023-09-14 PROCEDURE — 83690 ASSAY OF LIPASE: CPT | Performed by: EMERGENCY MEDICINE

## 2023-09-14 PROCEDURE — 93010 ELECTROCARDIOGRAM REPORT: CPT | Performed by: INTERNAL MEDICINE

## 2023-09-14 PROCEDURE — 71046 X-RAY EXAM CHEST 2 VIEWS: CPT

## 2023-09-14 SDOH — ECONOMIC STABILITY - HOUSING INSECURITY: HOMELESS IN THE PAST 12 MONTHS: Z59.812

## 2023-09-14 NOTE — ED TRIAGE NOTES
Pt states that she has had intermittent CP and upper back pain since last night, pt recently became homeless

## 2023-09-15 ENCOUNTER — PATIENT OUTREACH (OUTPATIENT)
Dept: CASE MANAGEMENT | Facility: OTHER | Age: 63
End: 2023-09-15
Payer: MEDICARE

## 2023-09-15 ENCOUNTER — PATIENT OUTREACH (OUTPATIENT)
Dept: CASE MANAGEMENT | Facility: CLINIC | Age: 63
End: 2023-09-15
Payer: MEDICARE

## 2023-09-15 LAB
QT INTERVAL: 356 MS
QTC INTERVAL: 413 MS

## 2023-09-15 NOTE — OUTREACH NOTE
"AMBULATORY CASE MANAGEMENT NOTE    Name and Relationship of Patient/Support Person: Noemi Magallon \"Annel\" - Self    Patient Outreach  RN-ACM outreach with patient. Discussed  9/14/23 ED visit regarding .nonspecific chest pain. Patient treated and discharged. Patient states improvement regarding chest pain and dizziness. Patient states no difficulty with SOB. Patient states to have medications and compliant. Patient states to be homeless; has lack of family support and currently at library. Discussed options with patient of community resources and availability of patient outreach by [facility] case management social worker. Patient agreed to social work outreach.   Reviewed with patient ED AVS recommendations; role of RN-ACM and HRCM case management services. Patient verbalized understanding. No further questions voiced at this time.   Care Coordination  Care coordination with Melanie GARCIA regarding patient outreach and community resources of housing and transportation. Melanie verbalized understanding.  Adult Patient Profile  Questions/Answers      Flowsheet Row Most Recent Value   Symptoms/Conditions Managed at Home cardiovascular   Barriers to Managing Health lack of housing, lack of transportation   Cardiovascular Symptoms/Conditions chest pain   Cardiovascular Management Strategies other (see comments)  [Physician follow up]   Barriers to Taking Medication as Prescribed none            Social Work Assessment  Questions/Answers      Flowsheet Row Most Recent Value   Functional Status Comments Patient states independent with ADL's.        Send Education  Questions/Answers      Flowsheet Row Most Recent Value   Annual Wellness Visit:  Patient Has Completed   Other Patient Education/Resources  24/7 [facility] Nurse Call Line, Advanced Care Planning, MyChart   24/7 Nurse Call Line Education Method Verbal   ACP Education Method Verbal   MyChart Education Method Verbal   Advanced Directives: Patient Has "            Education Documentation  Unresolved/Worsening Symptoms, taught by Niru Whatley, RN at 9/15/2023  1:54 PM.  Learner: Patient  Readiness: Acceptance  Method: Explanation  Response: Verbalizes Understanding    Provider Follow-Up, taught by Niru Whatley, RN at 9/15/2023  1:54 PM.  Learner: Patient  Readiness: Acceptance  Method: Explanation  Response: Verbalizes Understanding          Niru GARCIA  Ambulatory Case Management    9/15/2023, 13:55 EDT

## 2023-09-15 NOTE — OUTREACH NOTE
"Care Coordination    SANDI received message from RN CM re: patient housing concerns. Patient currently without stable housing and \"slept at [facility] last night\" Per chart review, patient was recently in ED and declined to discuss housing options with ED Physician. RN CM requesting SW assistance with housing concerns.     Care Coordination    SANDI [facility] [place] bed reservation line. Dinner at 1500, check in (intake) at 1800. [facility] for lower mobility beds, as bed reservation line was closed for the day.   SANDI called [facility] to determine if [place] has any homeless outreach program, Advised this SW to contact [facility] [place]. SW contacted [facility] [place], however, they do not offer emergency shelters and reports there are no emergency shelters in [facility], or [facility]. Advised this SW to contact [facility] in [place]. Office closed until Monday.     Patient Outreach    SW called and spoke to patient re: housing concerns. Patient currently in Library in [place]. Patient reports lack of family / social support and has no where to stay, at this time, as she was recently asked to leave a friends house she was staying in. SW provided patient with resources for immediate housing /shelter options in [place]. Patient declined going to [place] and states that she will continue to live in current situation until possibly Monday when [employer] reopens. SW provided resources for [employer] will follow up with these services, at that time. Patient verbalized understanding.     ANALI VAUGHN notified.     Will continue to follow and assist, as appropriate.     Melanie TOLEDO -   Ambulatory Case Management    9/15/2023, 15:33 EDT    "

## 2023-09-15 NOTE — ED PROVIDER NOTES
Subjective   History of Present Illness  62-year-old female presents with intermittent left chest and shoulder pain starting about 24 hours ago while patient was sleeping [place] a [place].  Patient is recently homeless.  Has not identified any aggravating or relieving factors for her pain.  No associated symptoms.  No recent leg pains or swelling.  No dyspnea on exertion.  No nausea or vomiting.  Patient has been eating and drinking normally.  Patient denies history of cardiac disease.  Denies history of DVT or PE.  Patient received aspirin and nitro x1 with EMS which did not change her pain however by time of my evaluation patient is no longer having pain.    Review of Systems   All other systems reviewed and are negative.    Past Medical History:   Diagnosis Date    Acquired hypothyroidism     Arthritis     Asthma     Short of breath for almost a year    Back pain     Disease of thyroid gland     Heart murmur     Found out in February of this year Dr. Gruber told me when i started to go to him in February cause my last Dr. Scarlet Peterson didnt tell mè cause she a quack    Hyperlipidemia     Hypertension     Low back pain     Going on for 4 yrs or longer    Parkinson's disease     My brother and sister had it.    Renal disorder     acute kidney failure     Stroke     My mom had mini strokes       No Known Allergies    Past Surgical History:   Procedure Laterality Date    APPENDECTOMY      CERVICAL FUSION       SECTION      CHOLECYSTECTOMY      SPINE SURGERY      Had a infusion you have to talk to Dr. Fofana my neurologist at [facility]    TUBAL ABDOMINAL LIGATION      After my daughter was born.       Family History   Problem Relation Age of Onset    Alzheimer's disease Mother     Stroke Mother     Heart attack Father     Parkinsonism Sister     Dementia Sister     Heart disease Brother         My brother Brooks had heart failure and copd    Stroke Brother     COPD Brother      Parkinsonism Brother     Breast cancer Neg Hx        Social History     Socioeconomic History    Marital status:    Tobacco Use    Smoking status: Never    Smokeless tobacco: Never    Tobacco comments:     Passive smoke exposure   Vaping Use    Vaping Use: Never used   Substance and Sexual Activity    Alcohol use: No    Drug use: No    Sexual activity: Not Currently           Objective   Physical Exam  Constitutional:       General: She is not in acute distress.     Appearance: She is not toxic-appearing.   HENT:      Head: Normocephalic and atraumatic.      Nose: Nose normal.      Mouth/Throat:      Mouth: Mucous membranes are moist.      Pharynx: Oropharynx is clear.   Eyes:      Extraocular Movements: Extraocular movements intact.      Pupils: Pupils are equal, round, and reactive to light.   Cardiovascular:      Rate and Rhythm: Normal rate and regular rhythm.      Heart sounds: Normal heart sounds.   Pulmonary:      Effort: Pulmonary effort is normal. No respiratory distress.      Breath sounds: Normal breath sounds.   Abdominal:      General: There is no distension.      Palpations: Abdomen is soft.      Tenderness: There is no abdominal tenderness.   Musculoskeletal:         General: No swelling, tenderness, deformity or signs of injury. Normal range of motion.      Cervical back: Normal range of motion and neck supple. No tenderness.   Skin:     General: Skin is warm and dry.   Neurological:      General: No focal deficit present.      Mental Status: She is alert and oriented to person, place, and time. Mental status is at baseline.   Psychiatric:         Mood and Affect: Mood normal.         Behavior: Behavior normal.         Thought Content: Thought content normal.         Judgment: Judgment normal.       Procedures           ED Course  ED Course as of 09/14/23 2014   Thu Sep 14, 2023   1920 EKG obtained at 1910 shows sinus rhythm, rate 81, normal NM and QTc, no ST segment or T wave changes. [TD]    2010 Troponin negative.  EKG reassuring.  Chest x-ray is negative.  Overall patient is well-appearing and I have low suspicion for cardiopulmonary etiology of patient's symptoms.  I did attempt to address patient's homelessness with her and she was not really interested in talking about this with me.  I will have nursing staff attempt to reassess this to see if there is any needs we can meet or help her with tonight.  I think this is the root of patient's issue tonight and in general the reason for her presentation here. [TD]      ED Course User Index  [TD] Robert Oliva MD                                           Medical Decision Making  Amount and/or Complexity of Data Reviewed  Labs: ordered.  Radiology: ordered.  ECG/medicine tests: ordered.        Final diagnoses:   Nonspecific chest pain   Housing instability after recent homelessness       ED Disposition  ED Disposition       ED Disposition   Discharge    Condition   Stable    Comment   --               Yasmani Schmidt, DO  [address] [facility] [address]  [phone]    In 1 day           Medication List      No changes were made to your prescriptions during this visit.            Robert Oliva MD  09/14/23 2014

## 2023-09-18 ENCOUNTER — PATIENT OUTREACH (OUTPATIENT)
Dept: CASE MANAGEMENT | Facility: CLINIC | Age: 63
End: 2023-09-18
Payer: MEDICARE

## 2023-09-18 NOTE — OUTREACH NOTE
Care Coordination    SW called [facility] ([facility]) re: patient homelessness status. SW provided [facility] manager (Cheryl) with name and . Per manager, [facility] has been working with this patient >12 years and has provided her with several housing opportunities. [facility] has provided education regarding [facility] and Emergency Shelters in [place]. [facility] has exhausted all resources available to patient.     Patient Outreach    SW called and spoke to patient via telephone. Provided resources and education re: Emergency Shelters and Common Assessment Team via [facility]. SW to discharge as resources have been provided to patient. Please re consult SW if additional needs arise.     Melanie TOLEDO -   Ambulatory Case Management    2023, 16:18 EDT

## 2023-10-05 ENCOUNTER — TELEPHONE (OUTPATIENT)
Dept: FAMILY MEDICINE CLINIC | Facility: CLINIC | Age: 63
End: 2023-10-05
Payer: MEDICARE

## 2023-10-05 DIAGNOSIS — Z59.00 HOMELESS: Primary | ICD-10-CM

## 2023-10-05 SDOH — ECONOMIC STABILITY - HOUSING INSECURITY: HOMELESSNESS UNSPECIFIED: Z59.00

## 2023-10-05 NOTE — TELEPHONE ENCOUNTER
"  Caller: Noemi Magallon \"Annel\"    Relationship to patient: Self    Best call back number: [phone]    Patient is needing:     ASKS TO SPEAK TO DR COONEY         "

## 2023-10-06 NOTE — TELEPHONE ENCOUNTER
Pt called the office yesterday asking to borrow money. We have notice that she has been staying around the library at different hours of the day. I believe social work might need to step in with patient.

## 2023-10-09 ENCOUNTER — REFERRAL TRIAGE (OUTPATIENT)
Dept: CASE MANAGEMENT | Facility: CLINIC | Age: 63
End: 2023-10-09
Payer: MEDICARE

## 2023-10-09 ENCOUNTER — PATIENT OUTREACH (OUTPATIENT)
Dept: CASE MANAGEMENT | Facility: CLINIC | Age: 63
End: 2023-10-09
Payer: MEDICARE

## 2023-10-09 NOTE — OUTREACH NOTE
"Patient Outreach    SW received referral via PCP re: homelessness. Patient is well known to this SW. SW called and spoke to patient re: call made to PCP office. Patient disconnected call. SW called patient back and inquired if patient had contacted [place] [facility] to assist with housing resources and homeless outreach. Patient reports, \"I called, they wont help me\". SW provided education that Common Assessment Team works directly with [facility] and will assist with homeless outreach and housing if patient completes intake assessment. Patient verbalized she is no longer homeless and is staying with a friend. Patient disconnected call. This SW to discharge as resources have been provided.     Melanie TOLEDO -   Ambulatory Case Management    10/9/2023, 15:28 EDT    "

## 2023-10-28 DIAGNOSIS — M10.9 ACUTE GOUT INVOLVING TOE OF LEFT FOOT, UNSPECIFIED CAUSE: ICD-10-CM

## 2023-10-30 RX ORDER — ALLOPURINOL 100 MG/1
TABLET ORAL
Qty: 30 TABLET | Refills: 0 | Status: SHIPPED | OUTPATIENT
Start: 2023-10-30

## 2023-11-15 DIAGNOSIS — E03.9 ACQUIRED HYPOTHYROIDISM: ICD-10-CM

## 2023-11-15 RX ORDER — LEVOTHYROXINE SODIUM 112 UG/1
112 TABLET ORAL DAILY
Qty: 30 TABLET | Refills: 2 | Status: SHIPPED | OUTPATIENT
Start: 2023-11-15

## 2023-11-20 ENCOUNTER — OFFICE VISIT (OUTPATIENT)
Dept: CARDIOLOGY | Facility: CLINIC | Age: 63
End: 2023-11-20
Payer: MEDICARE

## 2023-11-20 VITALS
BODY MASS INDEX: 28.88 KG/M2 | SYSTOLIC BLOOD PRESSURE: 124 MMHG | OXYGEN SATURATION: 97 % | DIASTOLIC BLOOD PRESSURE: 84 MMHG | HEART RATE: 97 BPM | WEIGHT: 163 LBS | HEIGHT: 63 IN

## 2023-11-20 DIAGNOSIS — I35.0 MILD AORTIC VALVE STENOSIS: Primary | ICD-10-CM

## 2023-11-20 DIAGNOSIS — E78.00 HYPERCHOLESTEROLEMIA: ICD-10-CM

## 2023-11-20 DIAGNOSIS — I10 PRIMARY HYPERTENSION: ICD-10-CM

## 2023-11-20 PROCEDURE — 99214 OFFICE O/P EST MOD 30 MIN: CPT | Performed by: INTERNAL MEDICINE

## 2023-11-20 PROCEDURE — 3074F SYST BP LT 130 MM HG: CPT | Performed by: INTERNAL MEDICINE

## 2023-11-20 PROCEDURE — 3079F DIAST BP 80-89 MM HG: CPT | Performed by: INTERNAL MEDICINE

## 2023-11-20 RX ORDER — LISINOPRIL 5 MG/1
1 TABLET ORAL DAILY
COMMUNITY
Start: 2023-06-25 | End: 2023-11-20

## 2023-11-20 RX ORDER — COLCHICINE 0.6 MG/1
TABLET ORAL AS NEEDED
COMMUNITY
Start: 2023-08-17 | End: 2023-11-20

## 2023-11-20 NOTE — PROGRESS NOTES
PATIENTINFORMATION    Date of Office Visit: 2023  Encounter Provider: Navneet Chau MD  Place of Service: [facility] CARDIOLOGY  Patient Name: Noemi Magallon  : 1960    Subjective:     Encounter Date:2023      Patient ID: Noemi Magallon is a 62 y.o. female.    No chief complaint on file.    HPI  Ms. Magallon is a pleasant 62 years old lady who came to cardiology clinic for follow-up visit.  She is compliant with all current medications without significant side effects and denies any ER visit or hospitalization since last clinic visit.  She denies any rest or exertional chest pain, orthopnea, PND, palpitations, presyncope syncope or extremity swelling.   Shortness of breath has significantly improved.  Lisinopril discontinued by Dr. Schmidt because of hypotension.  Currently on hydrochlorothiazide    She has become homeless and currently living on is straight.  She is getting all her medications and compliant.      ROS  All systems reviewed and negative except as noted in HPI.    Past Medical History:   Diagnosis Date    Acquired hypothyroidism     Arthritis     Asthma     Short of breath for almost a year    Back pain     Disease of thyroid gland     Heart murmur     Found out in February of this year Dr. Gruber told me when i started to go to him in February cause my last Dr. Scarlet Peterson didnt tell mè cause she a quack    Hyperlipidemia     Hypertension     Low back pain     Going on for 4 yrs or longer    Parkinson's disease     My brother and sister had it.    Renal disorder     acute kidney failure     Stroke     My mom had mini strokes       Past Surgical History:   Procedure Laterality Date    APPENDECTOMY      CERVICAL FUSION       SECTION      CHOLECYSTECTOMY      SPINE SURGERY      Had a infusion you have to talk to Dr. Fofana my neurologist at [facility]    TUBAL ABDOMINAL LIGATION      After my daughter was born.       Social History  "    Socioeconomic History    Marital status:    Tobacco Use    Smoking status: Never    Smokeless tobacco: Never    Tobacco comments:     Passive smoke exposure   Vaping Use    Vaping Use: Never used   Substance and Sexual Activity    Alcohol use: No    Drug use: No    Sexual activity: Not Currently     Partners: Male     Birth control/protection: None       Family History   Problem Relation Age of Onset    Alzheimer's disease Mother     Stroke Mother     Heart attack Father         Had heart attack    Parkinsonism Sister     Dementia Sister     Heart disease Brother         My brother Brooks had heart failure and copd    Stroke Brother     COPD Brother     Parkinsonism Brother     Breast cancer Neg Hx          Procedures       Objective:     /84 (BP Location: Left arm, Patient Position: Sitting, Cuff Size: Adult)   Pulse 97   Ht 160 cm (63\")   Wt 73.9 kg (163 lb)   LMP  (LMP Unknown)   SpO2 97%   BMI 28.87 kg/m²  Body mass index is 28.87 kg/m².     Constitutional:       General: Not in acute distress.     Appearance: Well-developed. Not diaphoretic.   Eyes:      Pupils: Pupils are equal, round, and reactive to light.   HENT:      Head: Normocephalic and atraumatic.   Neck:      Thyroid: No thyromegaly.   Pulmonary:      Effort: Pulmonary effort is normal. No respiratory distress.      Breath sounds: Normal breath sounds. No wheezing. No rales.   Chest:      Chest wall: Not tender to palpatation.   Cardiovascular:      Tachycardia present. Regular rhythm.      Murmurs: There is a harsh midsystolic murmur at the URSB, radiating to the neck.      No gallop.    Pulses:     Intact distal pulses.   Edema:     Peripheral edema absent.   Abdominal:      General: Bowel sounds are normal. There is no distension.      Palpations: Abdomen is soft.      Tenderness: There is no guarding.   Musculoskeletal: Normal range of motion.         General: No deformity.      Cervical back: Normal range of motion and " neck supple. Skin:     General: Skin is warm and dry.      Findings: No rash.   Neurological:      Mental Status: Alert and oriented to person, place, and time.      Cranial Nerves: No cranial nerve deficit.      Deep Tendon Reflexes: Reflexes are normal and symmetric.   Psychiatric:         Judgment: Judgment normal.         Review Of Data: I have reviewed pertinent recent labs, images and documents and pertinent findings included in HPI or assessment below.    Lipid Panel          2/17/2023    13:40   Lipid Panel   Total Cholesterol 170    Triglycerides 127    HDL Cholesterol 56    VLDL Cholesterol 22    LDL Cholesterol  92          Assessment/Plan:         Assessment   Essential hypertension-on hydrochlorothiazide.  Lisinopril discontinued because of hypotension.  Calcified and thickened aortic valve with mild aortic valve stenosis present/mild mitral valve regurgitation-on echocardiogram done on 4/7/2023.  Normal left ventricular systolic and diastolic functions.  Hyperlipidemia-lipid panel at goal on statin.  Hypothyroidism-on replacement therapy  History of exertional shortness of breath-normal myocardial perfusion study in May 2023    No significant complaints today.  On exam she has aortic valve stenosis murmur.  Also slightly tachycardic but denies significant symptoms.  She is seeing Dr. Schmidt tomorrow and getting blood work-TSH/free T4 may need to be checked along with electrolytes.  She also reports some mild hand numbness and I will tell her to communicate with Dr. Schmidt( if needed can switch bp meds to a bb)    Otherwise follow-up in cardiac clinic in 1 year.    Diagnosis and plan of care discussed with patient and verbalized understanding.            Your medication list            Accurate as of November 20, 2023  2:28 PM. If you have any questions, ask your nurse or doctor.                CHANGE how you take these medications        Instructions Last Dose Given Next Dose Due   allopurinol 100 MG  tablet  Commonly known as: ZYLOPRIM  What changed: Another medication with the same name was removed. Continue taking this medication, and follow the directions you see here.  Changed by: Navneet Chau MD      Take 1 tablet by mouth once daily              CONTINUE taking these medications        Instructions Last Dose Given Next Dose Due   ASPIRIN 81 PO      Take 81 mg by mouth.       atorvastatin 10 MG tablet  Commonly known as: LIPITOR      Take 1 tablet by mouth Daily.       baclofen 10 MG tablet  Commonly known as: LIORESAL      Take 1 tablet by mouth 3 (Three) Times a Day.       hydroCHLOROthiazide 12.5 MG tablet  Commonly known as: HYDRODIURIL      Take 1 tablet by mouth Daily.       levothyroxine 112 MCG tablet  Commonly known as: SYNTHROID, LEVOTHROID      Take 1 tablet by mouth once daily                    Navneet Chau MD  11/20/23  14:28 EST

## 2023-11-20 NOTE — LETTER
2023       No Recipients    Patient: Noemi Magallon   YOB: 1960   Date of Visit: 2023     Dear Yasmani Schmidt, DO:       Thank you for referring Noemi Magallon to me for evaluation. Below are the relevant portions of my assessment and plan of care.    If you have questions, please do not hesitate to call me. I look forward to following Noemi along with you.         Sincerely,        Navneet Chau MD        CC:   No Recipients    Navneet Chau MD  23 1430  Sign when Signing Visit  PATIENTINFORMATION    Date of Office Visit: 2023  Encounter Provider: Navneet Chau MD  Place of Service: [facility] CARDIOLOGY  Patient Name: Noemi Magallon  : 1960    Subjective:     Encounter Date:2023      Patient ID: Noemi Magallon is a 62 y.o. female.    No chief complaint on file.    HPI  Ms. Magallon is a pleasant 62 years old lady who came to cardiology clinic for follow-up visit.  She is compliant with all current medications without significant side effects and denies any ER visit or hospitalization since last clinic visit.  She denies any rest or exertional chest pain, orthopnea, PND, palpitations, presyncope syncope or extremity swelling.   Shortness of breath has significantly improved.  Lisinopril discontinued by Dr. Schmidt because of hypotension.  Currently on hydrochlorothiazide    She has become homeless and currently living on [place] straight.  She is getting all her medications and compliant.      ROS  All systems reviewed and negative except as noted in HPI.    Past Medical History:   Diagnosis Date   • Acquired hypothyroidism    • Arthritis    • Asthma     Short of breath for almost a year   • Back pain    • Disease of thyroid gland    • Heart murmur     Found out in February of this year Dr. Gruber told me when i started to go to him in February cause my last Dr. Scarlet Peterson didnt tell mè cause she a quack   • Hyperlipidemia  "   • Hypertension    • Low back pain     Going on for 4 yrs or longer   • Parkinson's disease     My brother and sister had it.   • Renal disorder     acute kidney failure    • Stroke     My mom had mini strokes       Past Surgical History:   Procedure Laterality Date   • APPENDECTOMY     • CERVICAL FUSION     •  SECTION     • CHOLECYSTECTOMY     • SPINE SURGERY      Had a infusion you have to talk to Dr. Fofana my neurologist at [facility]   • TUBAL ABDOMINAL LIGATION      After my daughter was born.       Social History     Socioeconomic History   • Marital status:    Tobacco Use   • Smoking status: Never   • Smokeless tobacco: Never   • Tobacco comments:     Passive smoke exposure   Vaping Use   • Vaping Use: Never used   Substance and Sexual Activity   • Alcohol use: No   • Drug use: No   • Sexual activity: Not Currently     Partners: Male     Birth control/protection: None       Family History   Problem Relation Age of Onset   • Alzheimer's disease Mother    • Stroke Mother    • Heart attack Father         Had heart attack   • Parkinsonism Sister    • Dementia Sister    • Heart disease Brother         My brother Brooks had heart failure and copd   • Stroke Brother    • COPD Brother    • Parkinsonism Brother    • Breast cancer Neg Hx          Procedures       Objective:     /84 (BP Location: Left arm, Patient Position: Sitting, Cuff Size: Adult)   Pulse 97   Ht 160 cm (63\")   Wt 73.9 kg (163 lb)   LMP  (LMP Unknown)   SpO2 97%   BMI 28.87 kg/m²  Body mass index is 28.87 kg/m².     Constitutional:       General: Not in acute distress.     Appearance: Well-developed. Not diaphoretic.   Eyes:      Pupils: Pupils are equal, round, and reactive to light.   HENT:      Head: Normocephalic and atraumatic.   Neck:      Thyroid: No thyromegaly.   Pulmonary:      Effort: Pulmonary effort is normal. No respiratory distress.      Breath sounds: Normal breath sounds. No wheezing. No " rales.   Chest:      Chest wall: Not tender to palpatation.   Cardiovascular:      Tachycardia present. Regular rhythm.      Murmurs: There is a harsh midsystolic murmur at the URSB, radiating to the neck.      No gallop.    Pulses:     Intact distal pulses.   Edema:     Peripheral edema absent.   Abdominal:      General: Bowel sounds are normal. There is no distension.      Palpations: Abdomen is soft.      Tenderness: There is no guarding.   Musculoskeletal: Normal range of motion.         General: No deformity.      Cervical back: Normal range of motion and neck supple. Skin:     General: Skin is warm and dry.      Findings: No rash.   Neurological:      Mental Status: Alert and oriented to person, place, and time.      Cranial Nerves: No cranial nerve deficit.      Deep Tendon Reflexes: Reflexes are normal and symmetric.   Psychiatric:         Judgment: Judgment normal.         Review Of Data: I have reviewed pertinent recent labs, images and documents and pertinent findings included in HPI or assessment below.    Lipid Panel          2/17/2023    13:40   Lipid Panel   Total Cholesterol 170    Triglycerides 127    HDL Cholesterol 56    VLDL Cholesterol 22    LDL Cholesterol  92          Assessment/Plan:         Assessment   Essential hypertension-on hydrochlorothiazide.  Lisinopril discontinued because of hypotension.  Calcified and thickened aortic valve with mild aortic valve stenosis present/mild mitral valve regurgitation-on echocardiogram done on 4/7/2023.  Normal left ventricular systolic and diastolic functions.  Hyperlipidemia-lipid panel at goal on statin.  Hypothyroidism-on replacement therapy  History of exertional shortness of breath-normal myocardial perfusion study in May 2023    No significant complaints today.  On exam she has aortic valve stenosis murmur.  Also slightly tachycardic but denies significant symptoms.  She is seeing Dr. Schmidt tomorrow and getting blood work-TSH/free T4 may need to  be checked along with electrolytes.  She also reports some mild hand numbness and I will tell her to communicate with Dr. Schmidt( if needed can switch bp meds to a bb)    Otherwise follow-up in cardiac clinic in 1 year.    Diagnosis and plan of care discussed with patient and verbalized understanding.            Your medication list            Accurate as of November 20, 2023  2:28 PM. If you have any questions, ask your nurse or doctor.                CHANGE how you take these medications        Instructions Last Dose Given Next Dose Due   allopurinol 100 MG tablet  Commonly known as: ZYLOPRIM  What changed: Another medication with the same name was removed. Continue taking this medication, and follow the directions you see here.  Changed by: Navneet Chau MD      Take 1 tablet by mouth once daily              CONTINUE taking these medications        Instructions Last Dose Given Next Dose Due   ASPIRIN 81 PO      Take 81 mg by mouth.       atorvastatin 10 MG tablet  Commonly known as: LIPITOR      Take 1 tablet by mouth Daily.       baclofen 10 MG tablet  Commonly known as: LIORESAL      Take 1 tablet by mouth 3 (Three) Times a Day.       hydroCHLOROthiazide 12.5 MG tablet  Commonly known as: HYDRODIURIL      Take 1 tablet by mouth Daily.       levothyroxine 112 MCG tablet  Commonly known as: SYNTHROID, LEVOTHROID      Take 1 tablet by mouth once daily                    Navneet Chau MD  11/20/23  14:28 EST

## 2023-11-21 ENCOUNTER — OFFICE VISIT (OUTPATIENT)
Dept: FAMILY MEDICINE CLINIC | Facility: CLINIC | Age: 63
End: 2023-11-21
Payer: MEDICARE

## 2023-11-21 VITALS
WEIGHT: 166 LBS | HEART RATE: 102 BPM | BODY MASS INDEX: 29.41 KG/M2 | RESPIRATION RATE: 18 BRPM | OXYGEN SATURATION: 96 % | DIASTOLIC BLOOD PRESSURE: 86 MMHG | SYSTOLIC BLOOD PRESSURE: 132 MMHG | HEIGHT: 63 IN

## 2023-11-21 DIAGNOSIS — R73.9 HYPERGLYCEMIA: ICD-10-CM

## 2023-11-21 DIAGNOSIS — R20.0 NUMBNESS AND TINGLING IN BOTH HANDS: ICD-10-CM

## 2023-11-21 DIAGNOSIS — E78.00 HYPERCHOLESTEROLEMIA: ICD-10-CM

## 2023-11-21 DIAGNOSIS — I10 PRIMARY HYPERTENSION: ICD-10-CM

## 2023-11-21 DIAGNOSIS — D72.820 LYMPHOCYTOSIS: ICD-10-CM

## 2023-11-21 DIAGNOSIS — R20.2 NUMBNESS AND TINGLING IN BOTH HANDS: ICD-10-CM

## 2023-11-21 DIAGNOSIS — E03.9 ACQUIRED HYPOTHYROIDISM: Primary | ICD-10-CM

## 2023-11-21 LAB
ALBUMIN SERPL-MCNC: 4.6 G/DL (ref 3.5–5.2)
ALBUMIN/GLOB SERPL: 1.8 G/DL
ALP SERPL-CCNC: 131 U/L (ref 39–117)
ALT SERPL-CCNC: 15 U/L (ref 1–33)
AST SERPL-CCNC: 24 U/L (ref 1–32)
BASOPHILS # BLD AUTO: 0.08 10*3/MM3 (ref 0–0.2)
BASOPHILS NFR BLD AUTO: 0.9 % (ref 0–1.5)
BILIRUB SERPL-MCNC: 0.5 MG/DL (ref 0–1.2)
BUN SERPL-MCNC: 8 MG/DL (ref 8–23)
BUN/CREAT SERPL: 9.1 (ref 7–25)
CALCIUM SERPL-MCNC: 10.3 MG/DL (ref 8.6–10.5)
CHLORIDE SERPL-SCNC: 93 MMOL/L (ref 98–107)
CHOLEST SERPL-MCNC: 190 MG/DL (ref 0–200)
CO2 SERPL-SCNC: 31 MMOL/L (ref 22–29)
CREAT SERPL-MCNC: 0.88 MG/DL (ref 0.57–1)
EGFRCR SERPLBLD CKD-EPI 2021: 74.4 ML/MIN/1.73
EOSINOPHIL # BLD AUTO: 0.21 10*3/MM3 (ref 0–0.4)
EOSINOPHIL NFR BLD AUTO: 2.5 % (ref 0.3–6.2)
ERYTHROCYTE [DISTWIDTH] IN BLOOD BY AUTOMATED COUNT: 13 % (ref 12.3–15.4)
GLOBULIN SER CALC-MCNC: 2.6 GM/DL
GLUCOSE SERPL-MCNC: 97 MG/DL (ref 65–99)
HCT VFR BLD AUTO: 44.2 % (ref 34–46.6)
HDLC SERPL-MCNC: 67 MG/DL (ref 40–60)
HGB BLD-MCNC: 14.5 G/DL (ref 12–15.9)
IMM GRANULOCYTES # BLD AUTO: 0.02 10*3/MM3 (ref 0–0.05)
IMM GRANULOCYTES NFR BLD AUTO: 0.2 % (ref 0–0.5)
LDLC SERPL CALC-MCNC: 95 MG/DL (ref 0–100)
LYMPHOCYTES # BLD AUTO: 1.62 10*3/MM3 (ref 0.7–3.1)
LYMPHOCYTES NFR BLD AUTO: 19.2 % (ref 19.6–45.3)
MCH RBC QN AUTO: 27.4 PG (ref 26.6–33)
MCHC RBC AUTO-ENTMCNC: 32.8 G/DL (ref 31.5–35.7)
MCV RBC AUTO: 83.4 FL (ref 79–97)
MONOCYTES # BLD AUTO: 0.84 10*3/MM3 (ref 0.1–0.9)
MONOCYTES NFR BLD AUTO: 10 % (ref 5–12)
NEUTROPHILS # BLD AUTO: 5.67 10*3/MM3 (ref 1.7–7)
NEUTROPHILS NFR BLD AUTO: 67.2 % (ref 42.7–76)
NRBC BLD AUTO-RTO: 0 /100 WBC (ref 0–0.2)
PLATELET # BLD AUTO: 560 10*3/MM3 (ref 140–450)
POTASSIUM SERPL-SCNC: 4.1 MMOL/L (ref 3.5–5.2)
PROT SERPL-MCNC: 7.2 G/DL (ref 6–8.5)
RBC # BLD AUTO: 5.3 10*6/MM3 (ref 3.77–5.28)
SODIUM SERPL-SCNC: 137 MMOL/L (ref 136–145)
T4 FREE SERPL-MCNC: 2.02 NG/DL (ref 0.93–1.7)
TRIGL SERPL-MCNC: 162 MG/DL (ref 0–150)
TSH SERPL DL<=0.005 MIU/L-ACNC: 0.17 UIU/ML (ref 0.27–4.2)
VIT B12 SERPL-MCNC: 462 PG/ML (ref 211–946)
VLDLC SERPL CALC-MCNC: 28 MG/DL (ref 5–40)
WBC # BLD AUTO: 8.44 10*3/MM3 (ref 3.4–10.8)

## 2023-11-21 PROCEDURE — 99214 OFFICE O/P EST MOD 30 MIN: CPT | Performed by: STUDENT IN AN ORGANIZED HEALTH CARE EDUCATION/TRAINING PROGRAM

## 2023-11-21 PROCEDURE — 3079F DIAST BP 80-89 MM HG: CPT | Performed by: STUDENT IN AN ORGANIZED HEALTH CARE EDUCATION/TRAINING PROGRAM

## 2023-11-21 PROCEDURE — 3075F SYST BP GE 130 - 139MM HG: CPT | Performed by: STUDENT IN AN ORGANIZED HEALTH CARE EDUCATION/TRAINING PROGRAM

## 2023-11-21 NOTE — PROGRESS NOTES
Yasmani Schmidt DO  [facility] PRIMARY CARE  [address] [place] [address] [address]  [place] [id number] [phone]    Subjective      Name Noemi Magallon MRN [id number]    1960 AGE/SEX 62 y.o. / female      Chief Complaint Chief Complaint   Patient presents with    Hypothyroidism     Follow up     Numbness     Numbness in hands and fingers          Visit History for  2023    History of Present Illness  Noemi Magallon is a 62 y.o. female who presented today for Hypothyroidism (Follow up ) and Numbness (Numbness in hands and fingers )    The patient presents for evaluation of multiple medical concerns.    Saw cardiologist Dr. Chau yesterday, 2023, who wants updated lab work. Concerned about electrolyte levels. Would like to see if thyroid levels are what are causing palpitation. Also suggested considering changing hydrochlorothiazide to a beta blocker for palpitations. Has numbness and tingling in bilateral 4th and 5th upper extremity digits. Left upper extremity is more symptomatic than right. Had a neck fusion in the past. Nerves in lumbar spine are rubbing bones. Takes hydrochlorothiazide daily. Dr. Chau did not mention anything about murmur. Reports slight edema in lower extremities. .        Medications and Allergies   Current Outpatient Medications   Medication Instructions    allopurinol (ZYLOPRIM) 100 MG tablet Take 1 tablet by mouth once daily    ASPIRIN 81 PO 81 mg, Oral    atorvastatin (LIPITOR) 10 mg, Oral, Daily    baclofen (LIORESAL) 10 mg, Oral, 3 Times Daily    hydroCHLOROthiazide (HYDRODIURIL) 12.5 mg, Oral, Daily    levothyroxine (SYNTHROID, LEVOTHROID) 112 mcg, Oral, Daily     No Known Allergies   I have reviewed the above medications and allergies     Objective:      Vitals Vitals:    23 1020   BP: 132/86   BP Location: Right arm   Patient Position: Sitting   Cuff Size: Adult   Pulse: 102   Resp: 18   SpO2: 96%   Weight: 75.3 kg (166 lb)   Height:  "160 cm (63\")     Body mass index is 29.41 kg/m².    Physical Exam       Assessment/Plan      Issues Addressed/ Plan  1. Acquired hypothyroidism  Updated lab work has been obtained today, 11/21/2023, including thyroid panel and electrolytes levels. Further evaluation and treatment will be done once results are received.  - TSH  - T4, free    2. Primary hypertension  -Taking medication as directed.  Appears to be well-controlled.  Need to check liver and kidney function.  - Comprehensive metabolic panel    3. Hyperglycemia  -Elevation noted in the past.  Need to be rechecked.  - Hemoglobin A1c    4. Hypercholesterolemia  -Currently on atorvastatin 10 mg.  No unwanted side effects.  Needing to recheck lipid  - Lipid panel    5. Numbness and tingling in both hands  - Vitamin B12    6. Lymphocytosis  - CBC w AUTO Differential    There are no Patient Instructions on file for this visit.        Follow up  recommended Return in about 3 months (around 2/21/2024) for Blood Pressure.   - Dragon voice recognition software was utilized to complete this chart.  Every reasonable attempt was made to edit and correct the text, however some incorrect words may remain.   Yasmani Schmidt DO    Transcribed from ambient dictation for Yasmani Schmidt DO by Nanda Nation.  11/21/23   13:36 EST    Patient or patient representative verbalized consent to the visit recording.  I have personally performed the services described in this document as transcribed by the above individual, and it is both accurate and complete.           "

## 2023-11-27 ENCOUNTER — TELEPHONE (OUTPATIENT)
Dept: FAMILY MEDICINE CLINIC | Facility: CLINIC | Age: 63
End: 2023-11-27
Payer: MEDICARE

## 2023-11-27 NOTE — TELEPHONE ENCOUNTER
"    Caller: Noemi Magallon \"Annel\"    Relationship: Self    Best call back number: [phone]     What test was performed: BLOODWORK    When was the test performed: 11/21    Where was the test performed: IN OFFICE    Additional notes: PLEASE CALL PATIENT TO REVIEW TEST RESULTS      "

## 2023-11-27 NOTE — TELEPHONE ENCOUNTER
"    Hub staff attempted to follow warm transfer process and was unsuccessful     Caller: Noemi Magallon \"Annel\"    Relationship to patient: Self    Best call back number: [phone]     Patient is needing: PATIENT REQUESTED TO SPEAK TO SOMEONE IN OFFICE.        "

## 2023-11-29 DIAGNOSIS — M10.9 ACUTE GOUT INVOLVING TOE OF LEFT FOOT, UNSPECIFIED CAUSE: ICD-10-CM

## 2023-11-29 RX ORDER — ALLOPURINOL 100 MG/1
TABLET ORAL
Qty: 30 TABLET | Refills: 0 | Status: SHIPPED | OUTPATIENT
Start: 2023-11-29

## 2023-12-02 DIAGNOSIS — I10 PRIMARY HYPERTENSION: ICD-10-CM

## 2023-12-04 RX ORDER — HYDROCHLOROTHIAZIDE 12.5 MG/1
12.5 TABLET ORAL DAILY
Qty: 90 TABLET | Refills: 0 | Status: SHIPPED | OUTPATIENT
Start: 2023-12-04

## 2024-01-01 DIAGNOSIS — M10.9 ACUTE GOUT INVOLVING TOE OF LEFT FOOT, UNSPECIFIED CAUSE: ICD-10-CM

## 2024-01-02 RX ORDER — ALLOPURINOL 100 MG/1
TABLET ORAL
Qty: 30 TABLET | Refills: 1 | Status: SHIPPED | OUTPATIENT
Start: 2024-01-02

## 2024-01-29 RX ORDER — ATORVASTATIN CALCIUM 10 MG/1
10 TABLET, FILM COATED ORAL DAILY
Qty: 90 TABLET | Refills: 0 | Status: SHIPPED | OUTPATIENT
Start: 2024-01-29

## 2024-01-29 NOTE — TELEPHONE ENCOUNTER
"    Caller: Noemi Magallon \"Annel\"    Relationship: Self    Best call back number: [phone]    Requested Prescriptions:   Requested Prescriptions     Pending Prescriptions Disp Refills    atorvastatin (LIPITOR) 10 MG tablet 90 tablet 0     Sig: Take 1 tablet by mouth Daily.        Pharmacy where request should be sent: [facility] [address], [place] [address] [facility] [phone] [facility] [phone] FX     Last office visit with prescribing clinician: 11/21/2023   Last telemedicine visit with prescribing clinician: Visit date not found   Next office visit with prescribing clinician: 2/20/2024     Does the patient have less than a 3 day supply:  [] Yes  [x] No    Tierra Kulkarni Rep   01/29/24 09:07 EST         "

## 2024-02-03 DIAGNOSIS — E03.9 ACQUIRED HYPOTHYROIDISM: ICD-10-CM

## 2024-02-05 RX ORDER — LEVOTHYROXINE SODIUM 112 UG/1
112 TABLET ORAL DAILY
Qty: 90 TABLET | Refills: 0 | Status: SHIPPED | OUTPATIENT
Start: 2024-02-05

## 2024-02-20 ENCOUNTER — OFFICE VISIT (OUTPATIENT)
Dept: FAMILY MEDICINE CLINIC | Facility: CLINIC | Age: 64
End: 2024-02-20
Payer: MEDICARE

## 2024-02-20 VITALS
BODY MASS INDEX: 30.3 KG/M2 | TEMPERATURE: 97.7 F | HEART RATE: 75 BPM | OXYGEN SATURATION: 99 % | WEIGHT: 171 LBS | HEIGHT: 63 IN | DIASTOLIC BLOOD PRESSURE: 84 MMHG | SYSTOLIC BLOOD PRESSURE: 128 MMHG

## 2024-02-20 DIAGNOSIS — E03.9 ACQUIRED HYPOTHYROIDISM: ICD-10-CM

## 2024-02-20 DIAGNOSIS — I10 PRIMARY HYPERTENSION: Primary | ICD-10-CM

## 2024-02-20 DIAGNOSIS — R73.9 HYPERGLYCEMIA: ICD-10-CM

## 2024-02-20 DIAGNOSIS — E78.00 HYPERCHOLESTEROLEMIA: ICD-10-CM

## 2024-02-20 PROCEDURE — 99214 OFFICE O/P EST MOD 30 MIN: CPT | Performed by: STUDENT IN AN ORGANIZED HEALTH CARE EDUCATION/TRAINING PROGRAM

## 2024-02-20 PROCEDURE — 36415 COLL VENOUS BLD VENIPUNCTURE: CPT | Performed by: STUDENT IN AN ORGANIZED HEALTH CARE EDUCATION/TRAINING PROGRAM

## 2024-02-20 PROCEDURE — 3074F SYST BP LT 130 MM HG: CPT | Performed by: STUDENT IN AN ORGANIZED HEALTH CARE EDUCATION/TRAINING PROGRAM

## 2024-02-20 PROCEDURE — 1159F MED LIST DOCD IN RCRD: CPT | Performed by: STUDENT IN AN ORGANIZED HEALTH CARE EDUCATION/TRAINING PROGRAM

## 2024-02-20 PROCEDURE — 3079F DIAST BP 80-89 MM HG: CPT | Performed by: STUDENT IN AN ORGANIZED HEALTH CARE EDUCATION/TRAINING PROGRAM

## 2024-02-20 PROCEDURE — 1160F RVW MEDS BY RX/DR IN RCRD: CPT | Performed by: STUDENT IN AN ORGANIZED HEALTH CARE EDUCATION/TRAINING PROGRAM

## 2024-02-20 NOTE — PROGRESS NOTES
Venipuncture Blood Specimen Collection  Venipuncture performed in left arm by Cyndi Rae MA with good hemostasis. Patient tolerated the procedure well without complications.   02/20/24   Cyndi Rae MA

## 2024-02-20 NOTE — PROGRESS NOTES
"    Yasmani Schmidt DO  [facility] PRIMARY CARE  [address] [place] [address] [address]  [place] [id number] [phone]    Subjective      Name Noemi Magallon MRN [id number]    1960 AGE/SEX 63 y.o. / female      Chief Complaint Chief Complaint   Patient presents with    Hypothyroidism    Hyperlipidemia         Visit History for  2024    History of Present Illness  Noemi Magallon is a 63 y.o. female who presented today for Hypothyroidism and Hyperlipidemia    She is accompanied by an adult female.    Has been doing well, and medications are working well for her.    Hair has been falling out, and she thinks it is due to her thyroid.    Ran out of her Ozempic and had her last injection a week ago. She was given a sample of Ozempic by Frances. On Saturday, 2024, she experienced nausea and vomiting. She took it for so long and did well on it.    Gout flares up occasionally, but it is not bad like it has been. Takes allopurinol every day.      Medications and Allergies   Current Outpatient Medications   Medication Instructions    allopurinol (ZYLOPRIM) 100 MG tablet Take 1 tablet by mouth once daily    ASPIRIN 81 PO 81 mg, Oral    atorvastatin (LIPITOR) 10 mg, Oral, Daily    baclofen (LIORESAL) 10 mg, Oral, 3 Times Daily    hydroCHLOROthiazide 12.5 mg, Oral, Daily    levothyroxine (SYNTHROID, LEVOTHROID) 112 mcg, Oral, Daily     Allergies   Allergen Reactions    Ibuprofen Other (See Comments)     Pt states she can't take this because of kidney issues      I have reviewed the above medications and allergies     Objective:      Vitals Vitals:    24 0948   BP: 128/84   Pulse: 75   Temp: 97.7 °F (36.5 °C)   SpO2: 99%   Weight: 77.6 kg (171 lb)   Height: 160 cm (62.99\")     Body mass index is 30.3 kg/m².    Physical Exam  Vitals reviewed.   Constitutional:       General: She is not in acute distress.     Appearance: She is not ill-appearing.   Cardiovascular:      Rate and Rhythm: Normal rate and " regular rhythm.      Heart sounds: Murmur heard.   Pulmonary:      Effort: Pulmonary effort is normal.   Psychiatric:         Mood and Affect: Mood normal.         Behavior: Behavior normal.         Thought Content: Thought content normal.         Judgment: Judgment normal.            Assessment/Plan      Issues Addressed/ Plan   Diagnosis Plan   1. Primary hypertension  Comprehensive metabolic panel      2. Acquired hypothyroidism  T4, free    TSH      3. Hypercholesterolemia  Lipid panel      4. Hyperglycemia  Hemoglobin A1c         There are no Patient Instructions on file for this visit.     Overall, patient is doing well. She is no longer completely homeless and living on the street, as she is now living with a friend in a hotel, and they seem to be getting along well. Overall seems to be happier and more balanced. Needs labs completed today as she has noticed some hair loss. Some concern for her thyroid levels. Also history of hyperglycemia on previous labs. Will check hemoglobin A1c. Otherwise, doing well. No further changes to medication currently. Gout is stable and continues to take allopurinol. Blood pressure today is stable. No changes to medication indicated.        Follow up  recommended Return in about 6 months (around 8/20/2024).   - Dragon voice recognition software was utilized to complete this chart.  Every reasonable attempt was made to edit and correct the text, however some incorrect words may remain.   Yasmani Schmidt DO    Transcribed from ambient dictation for Yasmani Schmidt DO by Kalina Welsh.  02/20/24   11:26 EST    Patient or patient representative verbalized consent to the visit recording.  I have personally performed the services described in this document as transcribed by the above individual, and it is both accurate and complete.

## 2024-02-21 LAB
ALBUMIN SERPL-MCNC: 4.5 G/DL (ref 3.5–5.2)
ALBUMIN/GLOB SERPL: 1.8 G/DL
ALP SERPL-CCNC: 125 U/L (ref 39–117)
ALT SERPL-CCNC: 13 U/L (ref 1–33)
AST SERPL-CCNC: 17 U/L (ref 1–32)
BILIRUB SERPL-MCNC: 0.3 MG/DL (ref 0–1.2)
BUN SERPL-MCNC: 14 MG/DL (ref 8–23)
BUN/CREAT SERPL: 17.1 (ref 7–25)
CALCIUM SERPL-MCNC: 10.1 MG/DL (ref 8.6–10.5)
CHLORIDE SERPL-SCNC: 99 MMOL/L (ref 98–107)
CHOLEST SERPL-MCNC: 188 MG/DL (ref 0–200)
CO2 SERPL-SCNC: 29.3 MMOL/L (ref 22–29)
CREAT SERPL-MCNC: 0.82 MG/DL (ref 0.57–1)
EGFRCR SERPLBLD CKD-EPI 2021: 80.5 ML/MIN/1.73
GLOBULIN SER CALC-MCNC: 2.5 GM/DL
GLUCOSE SERPL-MCNC: 87 MG/DL (ref 65–99)
HBA1C MFR BLD: 5.4 % (ref 4.8–5.6)
HDLC SERPL-MCNC: 67 MG/DL (ref 40–60)
LDLC SERPL CALC-MCNC: 99 MG/DL (ref 0–100)
POTASSIUM SERPL-SCNC: 4.6 MMOL/L (ref 3.5–5.2)
PROT SERPL-MCNC: 7 G/DL (ref 6–8.5)
SODIUM SERPL-SCNC: 141 MMOL/L (ref 136–145)
T4 FREE SERPL-MCNC: 1.41 NG/DL (ref 0.93–1.7)
TRIGL SERPL-MCNC: 124 MG/DL (ref 0–150)
TSH SERPL DL<=0.005 MIU/L-ACNC: 0.92 UIU/ML (ref 0.27–4.2)
VLDLC SERPL CALC-MCNC: 22 MG/DL (ref 5–40)

## 2024-02-27 DIAGNOSIS — I10 PRIMARY HYPERTENSION: ICD-10-CM

## 2024-02-27 RX ORDER — HYDROCHLOROTHIAZIDE 12.5 MG/1
12.5 TABLET ORAL DAILY
Qty: 90 TABLET | Refills: 0 | Status: SHIPPED | OUTPATIENT
Start: 2024-02-27

## 2024-02-28 ENCOUNTER — TELEPHONE (OUTPATIENT)
Dept: FAMILY MEDICINE CLINIC | Facility: CLINIC | Age: 64
End: 2024-02-28
Payer: MEDICARE

## 2024-02-28 DIAGNOSIS — M10.9 ACUTE GOUT INVOLVING TOE OF LEFT FOOT, UNSPECIFIED CAUSE: ICD-10-CM

## 2024-02-28 NOTE — TELEPHONE ENCOUNTER
Rx Refill Note  Requested Prescriptions     Pending Prescriptions Disp Refills    allopurinol (ZYLOPRIM) 100 MG tablet [Pharmacy Med Name: Allopurinol 100 MG Oral Tablet] 30 tablet 0     Sig: Take 1 tablet by mouth once daily      Last office visit with prescribing clinician: 2/20/2024   Last telemedicine visit with prescribing clinician: Visit date not found   Next office visit with prescribing clinician: 8/20/2024

## 2024-02-29 RX ORDER — ALLOPURINOL 100 MG/1
TABLET ORAL
Qty: 30 TABLET | Refills: 0 | Status: SHIPPED | OUTPATIENT
Start: 2024-02-29

## 2024-02-29 NOTE — TELEPHONE ENCOUNTER
"  Caller: NaunNoemi \"Annel\"    Relationship: Self    Best call back number: [phone]    Requested Prescriptions:   Requested Prescriptions     Pending Prescriptions Disp Refills    allopurinol (ZYLOPRIM) 100 MG tablet [Pharmacy Med Name: Allopurinol 100 MG Oral Tablet] 30 tablet 0     Sig: Take 1 tablet by mouth once daily        Pharmacy where request should be sent: [facility] [address] [place] [address] [address] [facility] [phone] [facility] [phone]      Last office visit with prescribing clinician: 2/20/2024   Last telemedicine visit with prescribing clinician: Visit date not found   Next office visit with prescribing clinician: 8/20/2024     Additional details provided by patient: NA    Does the patient have less than a 3 day supply:  [x] Yes  [] No    Would you like a call back once the refill request has been completed: [] Yes [x] No    If the office needs to give you a call back, can they leave a voicemail: [] Yes [x] No    Tierra Oquendo Rep   02/29/24 10:24 EST         "

## 2024-03-13 ENCOUNTER — TELEPHONE (OUTPATIENT)
Dept: FAMILY MEDICINE CLINIC | Facility: CLINIC | Age: 64
End: 2024-03-13
Payer: MEDICARE

## 2024-03-13 ENCOUNTER — PATIENT MESSAGE (OUTPATIENT)
Dept: FAMILY MEDICINE CLINIC | Facility: CLINIC | Age: 64
End: 2024-03-13
Payer: MEDICARE

## 2024-03-13 NOTE — TELEPHONE ENCOUNTER
"Noemi [place] \"Annel\"  [facility] Clinical Pool (supporting Yasmani Schmidt, )1 minute ago (1:47 PM)       Went to [facility] urgent care blood pressure is 118/82        Noemi [place] \"Annel\"  [facility] Clinical Pool (supporting Yasmani Schmidt, )1 hour ago (12:26 PM)       I been having headaches I think it's my blood pressure. I went to [facility] and check it there on that machine and it was 161/88 so I would like to see if you want to refill my blood pressure medicine or go to urgent care and see them cause I will have to walk to your office      "

## 2024-03-13 NOTE — TELEPHONE ENCOUNTER
Patient states that her blood pressure has been going up. Patient would like to know if Dr. Schmidt could put her back on Lisinopril medication.

## 2024-03-13 NOTE — TELEPHONE ENCOUNTER
----- Message from Noemi Magallon sent at 3/13/2024 12:26 PM EDT -----  Regarding: Blood pressure   Contact: [phone]  I been having headaches I think it's my blood pressure. I went to [facility] and check it there on that machine and it was 161/88 so I would like to see if you want to refill my blood pressure medicine or go to urgent care and see them cause I will have to walk to your office

## 2024-04-06 DIAGNOSIS — E03.9 ACQUIRED HYPOTHYROIDISM: ICD-10-CM

## 2024-04-06 DIAGNOSIS — M10.9 ACUTE GOUT INVOLVING TOE OF LEFT FOOT, UNSPECIFIED CAUSE: ICD-10-CM

## 2024-04-08 DIAGNOSIS — I10 PRIMARY HYPERTENSION: ICD-10-CM

## 2024-04-08 RX ORDER — ALLOPURINOL 100 MG/1
TABLET ORAL
Qty: 30 TABLET | Refills: 0 | Status: SHIPPED | OUTPATIENT
Start: 2024-04-08

## 2024-04-08 RX ORDER — ATORVASTATIN CALCIUM 10 MG/1
10 TABLET, FILM COATED ORAL DAILY
Qty: 90 TABLET | Refills: 0 | Status: SHIPPED | OUTPATIENT
Start: 2024-04-08

## 2024-04-08 RX ORDER — LEVOTHYROXINE SODIUM 112 UG/1
112 TABLET ORAL DAILY
Qty: 90 TABLET | Refills: 0 | Status: SHIPPED | OUTPATIENT
Start: 2024-04-08

## 2024-04-08 RX ORDER — LISINOPRIL 5 MG/1
5 TABLET ORAL DAILY
Qty: 30 TABLET | Refills: 0 | Status: SHIPPED | OUTPATIENT
Start: 2024-04-08

## 2024-04-08 NOTE — TELEPHONE ENCOUNTER
"    Caller: Naun Noemi \"Annel\"    Relationship: Self    Best call back number: [phone]    Requested Prescriptions:   Requested Prescriptions     Pending Prescriptions Disp Refills    lisinopril (PRINIVIL,ZESTRIL) 5 MG tablet 30 tablet 0     Sig: Take 1 tablet by mouth Daily.        Pharmacy where request should be sent: [facility] [address] [place], [place] [address] [facility] [phone] [facility] [phone] FX     Last office visit with prescribing clinician: 2/20/2024   Last telemedicine visit with prescribing clinician: Visit date not found   Next office visit with prescribing clinician: 8/20/2024     Additional details provided by patient: PATIENT STATES THAT SHE HAD HER BLOOD PRESSURE TAKEN AT [facility] AND IT /100. PATIENT WENT TO URGENT CARE AND IT READ NORMAL /70. PLEASE ADVISE PATIENT IF THIS MEDICATION CAN BE CALLED IN ASAP.     Does the patient have less than a 3 day supply:  [x] Yes  [] No    Would you like a call back once the refill request has been completed: [] Yes [x] No    If the office needs to give you a call back, can they leave a voicemail: [] Yes [x] No    Tierra Cabrera Rep   04/08/24 10:12 EDT       "

## 2024-04-25 ENCOUNTER — TELEPHONE (OUTPATIENT)
Dept: FAMILY MEDICINE CLINIC | Facility: CLINIC | Age: 64
End: 2024-04-25
Payer: MEDICARE

## 2024-04-25 DIAGNOSIS — Z12.31 ENCOUNTER FOR SCREENING MAMMOGRAM FOR BREAST CANCER: Primary | ICD-10-CM

## 2024-05-06 DIAGNOSIS — I10 PRIMARY HYPERTENSION: ICD-10-CM

## 2024-05-06 DIAGNOSIS — M10.9 ACUTE GOUT INVOLVING TOE OF LEFT FOOT, UNSPECIFIED CAUSE: ICD-10-CM

## 2024-05-07 RX ORDER — LISINOPRIL 5 MG/1
5 TABLET ORAL DAILY
Qty: 30 TABLET | Refills: 3 | Status: SHIPPED | OUTPATIENT
Start: 2024-05-07

## 2024-05-07 RX ORDER — ALLOPURINOL 100 MG/1
TABLET ORAL
Qty: 30 TABLET | Refills: 3 | Status: SHIPPED | OUTPATIENT
Start: 2024-05-07

## 2024-05-28 ENCOUNTER — APPOINTMENT (OUTPATIENT)
Dept: GENERAL RADIOLOGY | Facility: HOSPITAL | Age: 64
End: 2024-05-28
Payer: MEDICARE

## 2024-05-28 ENCOUNTER — HOSPITAL ENCOUNTER (EMERGENCY)
Facility: HOSPITAL | Age: 64
Discharge: HOME | End: 2024-05-28
Attending: EMERGENCY MEDICINE | Admitting: EMERGENCY MEDICINE
Payer: MEDICARE

## 2024-05-28 VITALS
HEART RATE: 68 BPM | DIASTOLIC BLOOD PRESSURE: 70 MMHG | BODY MASS INDEX: 34.55 KG/M2 | WEIGHT: 176 LBS | RESPIRATION RATE: 16 BRPM | OXYGEN SATURATION: 96 % | TEMPERATURE: 98.2 F | HEIGHT: 60 IN | SYSTOLIC BLOOD PRESSURE: 119 MMHG

## 2024-05-28 DIAGNOSIS — R07.9 CHEST PAIN, UNSPECIFIED TYPE: Primary | ICD-10-CM

## 2024-05-28 LAB
ALBUMIN SERPL-MCNC: 4.1 G/DL (ref 3.5–5.2)
ALBUMIN/GLOB SERPL: 1.5 G/DL
ALP SERPL-CCNC: 105 U/L (ref 39–117)
ALT SERPL W P-5'-P-CCNC: 10 U/L (ref 1–33)
ANION GAP SERPL CALCULATED.3IONS-SCNC: 8 MMOL/L (ref 5–15)
AST SERPL-CCNC: 19 U/L (ref 1–32)
BASOPHILS # BLD AUTO: 0.08 10*3/MM3 (ref 0–0.2)
BASOPHILS NFR BLD AUTO: 0.9 % (ref 0–1.5)
BILIRUB SERPL-MCNC: 0.3 MG/DL (ref 0–1.2)
BUN SERPL-MCNC: 17 MG/DL (ref 8–23)
BUN/CREAT SERPL: 25 (ref 7–25)
CALCIUM SPEC-SCNC: 9.6 MG/DL (ref 8.6–10.5)
CHLORIDE SERPL-SCNC: 102 MMOL/L (ref 98–107)
CO2 SERPL-SCNC: 28 MMOL/L (ref 22–29)
CREAT SERPL-MCNC: 0.68 MG/DL (ref 0.57–1)
D DIMER PPP FEU-MCNC: <0.27 MCGFEU/ML (ref 0–0.63)
DEPRECATED RDW RBC AUTO: 46.9 FL (ref 37–54)
EGFRCR SERPLBLD CKD-EPI 2021: 98 ML/MIN/1.73
EOSINOPHIL # BLD AUTO: 0.5 10*3/MM3 (ref 0–0.4)
EOSINOPHIL NFR BLD AUTO: 5.6 % (ref 0.3–6.2)
ERYTHROCYTE [DISTWIDTH] IN BLOOD BY AUTOMATED COUNT: 14.5 % (ref 12.3–15.4)
GEN 5 2HR TROPONIN T REFLEX: <6 NG/L
GLOBULIN UR ELPH-MCNC: 2.8 GM/DL
GLUCOSE SERPL-MCNC: 101 MG/DL (ref 65–99)
HCT VFR BLD AUTO: 41.2 % (ref 34–46.6)
HGB BLD-MCNC: 13.2 G/DL (ref 12–15.9)
HOLD SPECIMEN: NORMAL
HOLD SPECIMEN: NORMAL
IMM GRANULOCYTES # BLD AUTO: 0.04 10*3/MM3 (ref 0–0.05)
IMM GRANULOCYTES NFR BLD AUTO: 0.5 % (ref 0–0.5)
LYMPHOCYTES # BLD AUTO: 1.92 10*3/MM3 (ref 0.7–3.1)
LYMPHOCYTES NFR BLD AUTO: 21.7 % (ref 19.6–45.3)
MCH RBC QN AUTO: 28.7 PG (ref 26.6–33)
MCHC RBC AUTO-ENTMCNC: 32 G/DL (ref 31.5–35.7)
MCV RBC AUTO: 89.6 FL (ref 79–97)
MONOCYTES # BLD AUTO: 0.82 10*3/MM3 (ref 0.1–0.9)
MONOCYTES NFR BLD AUTO: 9.3 % (ref 5–12)
NEUTROPHILS NFR BLD AUTO: 5.49 10*3/MM3 (ref 1.7–7)
NEUTROPHILS NFR BLD AUTO: 62 % (ref 42.7–76)
NRBC BLD AUTO-RTO: 0 /100 WBC (ref 0–0.2)
NT-PROBNP SERPL-MCNC: 76.4 PG/ML (ref 0–900)
PLATELET # BLD AUTO: 497 10*3/MM3 (ref 140–450)
PMV BLD AUTO: 11.2 FL (ref 6–12)
POTASSIUM SERPL-SCNC: 3.6 MMOL/L (ref 3.5–5.2)
PROT SERPL-MCNC: 6.9 G/DL (ref 6–8.5)
QT INTERVAL: 376 MS
QTC INTERVAL: 405 MS
RBC # BLD AUTO: 4.6 10*6/MM3 (ref 3.77–5.28)
SODIUM SERPL-SCNC: 138 MMOL/L (ref 136–145)
TROPONIN T DELTA: NORMAL
TROPONIN T SERPL HS-MCNC: <6 NG/L
WBC NRBC COR # BLD AUTO: 8.85 10*3/MM3 (ref 3.4–10.8)
WHOLE BLOOD HOLD COAG: NORMAL
WHOLE BLOOD HOLD SPECIMEN: NORMAL

## 2024-05-28 PROCEDURE — 71046 X-RAY EXAM CHEST 2 VIEWS: CPT

## 2024-05-28 PROCEDURE — 93005 ELECTROCARDIOGRAM TRACING: CPT

## 2024-05-28 PROCEDURE — 83880 ASSAY OF NATRIURETIC PEPTIDE: CPT | Performed by: EMERGENCY MEDICINE

## 2024-05-28 PROCEDURE — 80053 COMPREHEN METABOLIC PANEL: CPT

## 2024-05-28 PROCEDURE — 85379 FIBRIN DEGRADATION QUANT: CPT | Performed by: EMERGENCY MEDICINE

## 2024-05-28 PROCEDURE — 36415 COLL VENOUS BLD VENIPUNCTURE: CPT

## 2024-05-28 PROCEDURE — 85025 COMPLETE CBC W/AUTO DIFF WBC: CPT

## 2024-05-28 PROCEDURE — 93010 ELECTROCARDIOGRAM REPORT: CPT | Performed by: INTERNAL MEDICINE

## 2024-05-28 PROCEDURE — 84484 ASSAY OF TROPONIN QUANT: CPT

## 2024-05-28 PROCEDURE — 99284 EMERGENCY DEPT VISIT MOD MDM: CPT

## 2024-05-28 RX ORDER — SODIUM CHLORIDE 0.9 % (FLUSH) 0.9 %
10 SYRINGE (ML) INJECTION AS NEEDED
Status: DISCONTINUED | OUTPATIENT
Start: 2024-05-28 | End: 2024-05-28 | Stop reason: HOSPADM

## 2024-05-28 NOTE — ED PROVIDER NOTES
Subjective   History of Present Illness    Chief complaint: Chest pain    Location: Left    Quality/Severity: 8/10 at its worst, 0/10 currently, sharp    Timing/Onset/Duration: Approximately 30 minutes prior to arrival    Modifying Factors: Worse with exertion    Associated Symptoms: Mild headache.  No fever chills or cough.  No sore throat earache or nasal congestion.  No shortness of breath.  No diaphoresis.  No nausea or vomiting.  No abdominal pain.  No diarrhea or burning when she urinates.  No nausea or vomiting.    Narrative: This 63-year-old presents with sudden onset of left-sided chest pain that started about 20 minutes prior to arrival while walking across the lobby of the hotel she is staying at.  Patient states she felt lightheaded at that time but no diaphoresis.  No nausea or vomiting.  EKG in room per EMS was unremarkable.  Patient had 3 to 24 mg of aspirin and 1 nitro en route with relief.  The patient has never had an MI.  Patient's father and brother  of MIs in their 70s.  The patient has hypertension.  She does not smoke.  She is not diabetic.  She has no history of elevated cholesterol.  Patient states that approximately a year ago she had a stress test that was unremarkable patient is never had a cardiac cath.  No recent long trips or surgeries.  No bleeding or clotting problems.  The patient has no history of cancer.  States she has some type of blood disorder she sees a hematologist for but she is not exactly sure what it is.  She does have a known heart murmur.  The patient is not on hormone therapy except for levothyroxine.  The patient's had more fatigue with exertion lately    PCP:Yasmani Schmidt,       Review of Systems   Constitutional:  Positive for fatigue.   HENT:  Negative for congestion, ear pain and sore throat.    Respiratory:  Negative for cough.    Cardiovascular:  Positive for chest pain.   Gastrointestinal:  Negative for abdominal pain, diarrhea, nausea and vomiting.    Genitourinary:  Negative for difficulty urinating.   Musculoskeletal:  Negative for back pain.   Skin:  Negative for rash.   Neurological:  Positive for headaches.         Past Medical History:   Diagnosis Date    Acquired hypothyroidism     Arthritis     Asthma     Short of breath for almost a year    Back pain     Disease of thyroid gland     Heart murmur     Found out in February of this year Dr. Gruber told me when i started to go to him in February cause my last Dr. Scarlet Peterson didnt tell mè cause she a quack    Hyperlipidemia     Hypertension     Low back pain     Going on for 4 yrs or longer    Parkinson's disease     My brother and sister had it.    Renal disorder     acute kidney failure     Stroke     My mom had mini strokes       Allergies   Allergen Reactions    Ibuprofen Other (See Comments)     Pt states she can't take this because of kidney issues       Past Surgical History:   Procedure Laterality Date    APPENDECTOMY      CERVICAL FUSION       SECTION      CHOLECYSTECTOMY      SPINE SURGERY      Had a infusion you have to talk to Dr. Fofana my neurologist at [facility]    TUBAL ABDOMINAL LIGATION      After my daughter was born.       Family History   Problem Relation Age of Onset    Alzheimer's disease Mother     Stroke Mother     Heart attack Father         Had heart attack    Parkinsonism Sister     Dementia Sister     Heart disease Brother         My brother Brooks had heart failure and copd    Stroke Brother     COPD Brother     Parkinsonism Brother     Breast cancer Neg Hx        Social History     Socioeconomic History    Marital status:    Tobacco Use    Smoking status: Never     Passive exposure: Never    Smokeless tobacco: Never    Tobacco comments:     Passive smoke exposure   Vaping Use    Vaping status: Never Used   Substance and Sexual Activity    Alcohol use: No    Drug use: No    Sexual activity: Not Currently     Partners: Male     Birth  control/protection: None           Objective   Physical Exam  Vitals (The temperature is 98.2 °F, pulse 83, respirations 16, /89, room air pulse ox 94%.) and nursing note reviewed.   Constitutional:       Appearance: She is well-developed.   HENT:      Head: Normocephalic and atraumatic.   Cardiovascular:      Rate and Rhythm: Normal rate and regular rhythm.      Heart sounds: No murmur heard.     No friction rub. No gallop.   Pulmonary:      Effort: Pulmonary effort is normal.      Breath sounds: Normal breath sounds.   Chest:      Chest wall: No tenderness.   Abdominal:      General: Bowel sounds are normal. There is no abdominal bruit.      Palpations: Abdomen is soft. There is no mass.      Tenderness: There is no abdominal tenderness. There is no guarding or rebound.   Musculoskeletal:         General: Normal range of motion.      Cervical back: Normal range of motion and neck supple.      Right lower leg: No tenderness. No edema.      Left lower leg: No tenderness. No edema.   Skin:     General: Skin is warm and dry.      Findings: No rash.   Neurological:      General: No focal deficit present.      Mental Status: She is alert and oriented to person, place, and time.         Procedures           ED Course  ED Course as of 05/28/24 1410   Tue May 28, 2024   1111 The CBC is unremarkable. [RC]   1119 The ABG reveals a pH of 7.44, pCO2 of 30, pO2 of 72 excess of -2, hemoglobin 15.  This is on a nonrebreather. [RC]   1251 The high-sensitivity troponin is less than 6 and normal. [RC]   1251 The proBNP is 76 and normal [RC]   1251 The CMP is unremarkable. [RC]   1251 The D-dimer is less than 0.27 and normal [RC]   1252 The platelet counts are slightly elevated at 497, the CBC is otherwise unremarkable [RC]   1409 The high-sensitivity opponent is less than 6 with a delta T of 0. [RC]   1409 The proBNP is 76 and normal. [RC]      ED Course User Index  [RC] Payam Nazario MD      10:58 EDT, 05/28/24:  EKG was  obtained at 1046 and read by me 1050.  The EKG shows normal sinus rhythm with rate of 70.  There is normal axis with no hypertrophy.  The ID, QRS, and QT intervals are unremarkable.  There is no ectopy.  There is no acute ST elevation or depression.  There is minor artifact in multiple leads.                           11:21 EDT, 05/28/24:  The pulmonary report on the CT read by Dr. Tsai shows a large bilateral PE, right heart strain, saddle embolus, RV left ventricular ratio is 1.7.  The patient will be started on a heparin drip.    14:13 EDT, 05/28/24:  The patient was reassessed.  She has no chest pain.  Her vital signs were reviewed and are stable.    14:13 EDT, 05/28/24:  His diagnosis of chest pain was discussed with her.  The patient should call Dr. Chau office today or tomorrow for follow-up appointment this week.  The patient should return to the emergency department if there is worsening pain, shortness of breath, worse in any way at all.  All the patient's questions were answered she will be discharged in good condition    14:16 EDT, 05/28/24:  The patient's heart score is 3.          Medical Decision Making      Final diagnoses:   Chest pain, unspecified type       ED Disposition  ED Disposition       None            No follow-up provider specified.       Medication List      No changes were made to your prescriptions during this visit.            Payam Nazario MD  05/28/24 2029

## 2024-05-28 NOTE — DISCHARGE INSTRUCTIONS
Call Dr. Chau today or tomorrow for follow-up appointment this week.  Return to emergency department if there is worsening pain, shortness of breath, worse anyway at all.

## 2024-06-15 DIAGNOSIS — I10 PRIMARY HYPERTENSION: ICD-10-CM

## 2024-06-17 RX ORDER — HYDROCHLOROTHIAZIDE 12.5 MG/1
12.5 TABLET ORAL DAILY
Qty: 90 TABLET | Refills: 0 | Status: SHIPPED | OUTPATIENT
Start: 2024-06-17

## 2024-07-18 RX ORDER — ATORVASTATIN CALCIUM 10 MG/1
10 TABLET, FILM COATED ORAL DAILY
Qty: 90 TABLET | Refills: 0 | Status: SHIPPED | OUTPATIENT
Start: 2024-07-18

## 2024-08-03 DIAGNOSIS — E03.9 ACQUIRED HYPOTHYROIDISM: ICD-10-CM

## 2024-08-05 RX ORDER — LEVOTHYROXINE SODIUM 112 UG/1
112 TABLET ORAL DAILY
Qty: 90 TABLET | Refills: 0 | Status: SHIPPED | OUTPATIENT
Start: 2024-08-05

## 2024-08-08 ENCOUNTER — OFFICE VISIT (OUTPATIENT)
Dept: FAMILY MEDICINE CLINIC | Facility: CLINIC | Age: 64
End: 2024-08-08
Payer: MEDICARE

## 2024-08-08 VITALS
RESPIRATION RATE: 18 BRPM | HEART RATE: 76 BPM | DIASTOLIC BLOOD PRESSURE: 66 MMHG | BODY MASS INDEX: 33.77 KG/M2 | WEIGHT: 172 LBS | SYSTOLIC BLOOD PRESSURE: 122 MMHG | HEIGHT: 60 IN | OXYGEN SATURATION: 96 %

## 2024-08-08 DIAGNOSIS — R73.9 HYPERGLYCEMIA: ICD-10-CM

## 2024-08-08 DIAGNOSIS — D50.8 IRON DEFICIENCY ANEMIA SECONDARY TO INADEQUATE DIETARY IRON INTAKE: ICD-10-CM

## 2024-08-08 DIAGNOSIS — E78.00 HYPERCHOLESTEROLEMIA: ICD-10-CM

## 2024-08-08 DIAGNOSIS — I10 PRIMARY HYPERTENSION: ICD-10-CM

## 2024-08-08 DIAGNOSIS — M1A.9XX0 CHRONIC GOUT WITHOUT TOPHUS, UNSPECIFIED CAUSE, UNSPECIFIED SITE: ICD-10-CM

## 2024-08-08 DIAGNOSIS — E03.9 ACQUIRED HYPOTHYROIDISM: Primary | ICD-10-CM

## 2024-08-08 DIAGNOSIS — E55.9 VITAMIN D DEFICIENCY: ICD-10-CM

## 2024-08-08 PROCEDURE — 1160F RVW MEDS BY RX/DR IN RCRD: CPT | Performed by: STUDENT IN AN ORGANIZED HEALTH CARE EDUCATION/TRAINING PROGRAM

## 2024-08-08 PROCEDURE — 3074F SYST BP LT 130 MM HG: CPT | Performed by: STUDENT IN AN ORGANIZED HEALTH CARE EDUCATION/TRAINING PROGRAM

## 2024-08-08 PROCEDURE — 3078F DIAST BP <80 MM HG: CPT | Performed by: STUDENT IN AN ORGANIZED HEALTH CARE EDUCATION/TRAINING PROGRAM

## 2024-08-08 PROCEDURE — 99214 OFFICE O/P EST MOD 30 MIN: CPT | Performed by: STUDENT IN AN ORGANIZED HEALTH CARE EDUCATION/TRAINING PROGRAM

## 2024-08-08 PROCEDURE — 1159F MED LIST DOCD IN RCRD: CPT | Performed by: STUDENT IN AN ORGANIZED HEALTH CARE EDUCATION/TRAINING PROGRAM

## 2024-08-08 PROCEDURE — 36415 COLL VENOUS BLD VENIPUNCTURE: CPT | Performed by: STUDENT IN AN ORGANIZED HEALTH CARE EDUCATION/TRAINING PROGRAM

## 2024-08-08 NOTE — PROGRESS NOTES
Venipuncture Blood Specimen Collection  Venipuncture performed in left arm by Cyndi Rae MA with good hemostasis. Patient tolerated the procedure well without complications.   08/08/24   Cyndi Rae MA

## 2024-08-09 LAB
25(OH)D3+25(OH)D2 SERPL-MCNC: 30.8 NG/ML (ref 30–100)
ALBUMIN SERPL-MCNC: 4.4 G/DL (ref 3.5–5.2)
ALBUMIN/GLOB SERPL: 1.9 G/DL
ALP SERPL-CCNC: 115 U/L (ref 39–117)
ALT SERPL-CCNC: 13 U/L (ref 1–33)
AST SERPL-CCNC: 20 U/L (ref 1–32)
BASOPHILS # BLD AUTO: 0.06 10*3/MM3 (ref 0–0.2)
BASOPHILS NFR BLD AUTO: 0.7 % (ref 0–1.5)
BILIRUB SERPL-MCNC: 0.5 MG/DL (ref 0–1.2)
BUN SERPL-MCNC: 20 MG/DL (ref 8–23)
BUN/CREAT SERPL: 24.7 (ref 7–25)
CALCIUM SERPL-MCNC: 10.3 MG/DL (ref 8.6–10.5)
CHLORIDE SERPL-SCNC: 100 MMOL/L (ref 98–107)
CHOLEST SERPL-MCNC: 153 MG/DL (ref 0–200)
CO2 SERPL-SCNC: 28.3 MMOL/L (ref 22–29)
CREAT SERPL-MCNC: 0.81 MG/DL (ref 0.57–1)
EGFRCR SERPLBLD CKD-EPI 2021: 81.7 ML/MIN/1.73
EOSINOPHIL # BLD AUTO: 0.44 10*3/MM3 (ref 0–0.4)
EOSINOPHIL NFR BLD AUTO: 5.1 % (ref 0.3–6.2)
ERYTHROCYTE [DISTWIDTH] IN BLOOD BY AUTOMATED COUNT: 12.9 % (ref 12.3–15.4)
GLOBULIN SER CALC-MCNC: 2.3 GM/DL
GLUCOSE SERPL-MCNC: 90 MG/DL (ref 65–99)
HBA1C MFR BLD: 5.4 % (ref 4.8–5.6)
HCT VFR BLD AUTO: 42.4 % (ref 34–46.6)
HDLC SERPL-MCNC: 52 MG/DL (ref 40–60)
HGB BLD-MCNC: 13.8 G/DL (ref 12–15.9)
IMM GRANULOCYTES # BLD AUTO: 0.02 10*3/MM3 (ref 0–0.05)
IMM GRANULOCYTES NFR BLD AUTO: 0.2 % (ref 0–0.5)
IRON SATN MFR SERPL: 22 % (ref 20–50)
IRON SERPL-MCNC: 80 MCG/DL (ref 37–145)
LDLC SERPL CALC-MCNC: 84 MG/DL (ref 0–100)
LYMPHOCYTES # BLD AUTO: 1.91 10*3/MM3 (ref 0.7–3.1)
LYMPHOCYTES NFR BLD AUTO: 22.3 % (ref 19.6–45.3)
MCH RBC QN AUTO: 28.6 PG (ref 26.6–33)
MCHC RBC AUTO-ENTMCNC: 32.5 G/DL (ref 31.5–35.7)
MCV RBC AUTO: 87.8 FL (ref 79–97)
MONOCYTES # BLD AUTO: 0.75 10*3/MM3 (ref 0.1–0.9)
MONOCYTES NFR BLD AUTO: 8.7 % (ref 5–12)
NEUTROPHILS # BLD AUTO: 5.4 10*3/MM3 (ref 1.7–7)
NEUTROPHILS NFR BLD AUTO: 63 % (ref 42.7–76)
NRBC BLD AUTO-RTO: 0 /100 WBC (ref 0–0.2)
PLATELET # BLD AUTO: 475 10*3/MM3 (ref 140–450)
POTASSIUM SERPL-SCNC: 4.7 MMOL/L (ref 3.5–5.2)
PROT SERPL-MCNC: 6.7 G/DL (ref 6–8.5)
RBC # BLD AUTO: 4.83 10*6/MM3 (ref 3.77–5.28)
SODIUM SERPL-SCNC: 141 MMOL/L (ref 136–145)
T4 FREE SERPL-MCNC: 1.88 NG/DL (ref 0.92–1.68)
TIBC SERPL-MCNC: 358 MCG/DL
TRIGL SERPL-MCNC: 91 MG/DL (ref 0–150)
TSH SERPL DL<=0.005 MIU/L-ACNC: 0.32 UIU/ML (ref 0.27–4.2)
UIBC SERPL-MCNC: 278 MCG/DL (ref 112–346)
URATE SERPL-MCNC: 4.9 MG/DL (ref 2.4–5.7)
VLDLC SERPL CALC-MCNC: 17 MG/DL (ref 5–40)
WBC # BLD AUTO: 8.58 10*3/MM3 (ref 3.4–10.8)
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
## 2024-08-20 ENCOUNTER — OFFICE VISIT (OUTPATIENT)
Dept: FAMILY MEDICINE CLINIC | Facility: CLINIC | Age: 64
End: 2024-08-20
Payer: MEDICARE

## 2024-08-20 VITALS
BODY MASS INDEX: 33.96 KG/M2 | WEIGHT: 173 LBS | DIASTOLIC BLOOD PRESSURE: 88 MMHG | SYSTOLIC BLOOD PRESSURE: 120 MMHG | RESPIRATION RATE: 16 BRPM | OXYGEN SATURATION: 98 % | HEIGHT: 60 IN | HEART RATE: 77 BPM

## 2024-08-20 DIAGNOSIS — D75.839 THROMBOCYTOSIS: Primary | ICD-10-CM

## 2024-08-20 DIAGNOSIS — M25.50 ARTHRALGIA, UNSPECIFIED JOINT: ICD-10-CM

## 2024-08-20 DIAGNOSIS — D72.10 EOSINOPHILIA, UNSPECIFIED TYPE: ICD-10-CM

## 2024-08-28 ENCOUNTER — TELEPHONE (OUTPATIENT)
Dept: FAMILY MEDICINE CLINIC | Facility: CLINIC | Age: 64
End: 2024-08-28
Payer: MEDICARE

## 2024-08-28 DIAGNOSIS — Z12.31 ENCOUNTER FOR SCREENING MAMMOGRAM FOR MALIGNANT NEOPLASM OF BREAST: Primary | ICD-10-CM

## 2024-08-31 PROBLEM — D75.839 THROMBOCYTOSIS: Status: ACTIVE | Noted: 2024-08-31

## 2024-08-31 NOTE — PROGRESS NOTES
"    Yasmani Schmidt DO  [facility]  [address]  [place] [id number] [phone]    Subjective      Name Noemi Magallon MRN [id number]    1960 AGE/SEX 63 y.o. / female      Chief Complaint Chief Complaint   Patient presents with    Hypothyroidism    Hypertension     Check up          Visit History for  2024    Noemi Magallon is a 63 y.o. female who presented today for Hypothyroidism and Hypertension (Check up )     History of Present Illness  The patient presents for a wellness visit.    She has been informed of a potential lupus diagnosis due to her symptoms of fatigue, swelling in her fingers and feet, and hair loss. She is scheduled to see Dr. Lyons in 10/2024. She reports no issues with bowel movements or allergies. She also mentions that she occasionally feels tired.       Medications and Allergies   Current Outpatient Medications   Medication Instructions    allopurinol (ZYLOPRIM) 100 MG tablet Take 1 tablet by mouth once daily    ASPIRIN 81 PO 81 mg, Oral    atorvastatin (LIPITOR) 10 mg, Oral, Daily    baclofen (LIORESAL) 10 mg, Oral, 3 Times Daily    hydroCHLOROthiazide 12.5 mg, Oral, Daily    levothyroxine (SYNTHROID, LEVOTHROID) 112 mcg, Oral, Daily     Allergies   Allergen Reactions    Ibuprofen Other (See Comments)     Pt states she can't take this because of kidney issues      I have reviewed the above medications and allergies     Objective:      Vitals Vitals:    24 0832   BP: 120/88   BP Location: Left arm   Patient Position: Sitting   Cuff Size: Adult   Pulse: 77   Resp: 16   SpO2: 98%   Weight: 78.5 kg (173 lb)   Height: 152.4 cm (60\")     Body mass index is 33.79 kg/m².    Physical Exam  Vitals reviewed.   Constitutional:       General: She is not in acute distress.     Appearance: She is not ill-appearing.   Pulmonary:      Effort: Pulmonary effort is normal.   Psychiatric:         Mood and Affect: Mood normal.         Behavior: " Behavior normal.         Thought Content: Thought content normal.         Judgment: Judgment normal.        Physical Exam       Results  Laboratory Studies  Platelets are elevated but have decreased compared to previous levels. Eosinophils are elevated. Thyroid function is normal.    Testing  Cologuard test came back normal.     Assessment/Plan   Issues Addressed/ Plan   Diagnosis Plan   1. Thrombocytosis        2. Eosinophilia, unspecified type        3. Arthralgia, unspecified joint           Assessment & Plan  1. Elevated Platelet Count.  Her platelet count remains elevated but has decreased to the lowest level observed in a long time. Aspirin therapy will be continued to prevent clot formation. The hematologist will continue to monitor this condition.    2. Eosinophilia.  Eosinophil levels have been elevated in the past two blood tests. There are no current symptoms of bowel problems, allergies, or significant weight loss. The hematologist will keep an eye on this condition, as it could be related to an allergen or other unknown factors. No immediate intervention is required.    3. Suspected Lupus.  She reports symptoms of fatigue, swelling in fingers and feet, and hair loss. She will follow up with Dr. Lyons in October for further evaluation.    4. Health Maintenance.  A Cologuard test was previously performed and returned normal.     Follow-up  The patient will follow up in 3 months.           There are no Patient Instructions on file for this visit.   Follow up  recommended Return in about 3 months (around 11/20/2024) for Medicare Wellness.   - Dragon voice recognition software was utilized to complete this chart.  Every reasonable attempt was made to edit and correct the text, however some incorrect words may remain.   Yasmani Schmidt DO    Patient or patient representative verbalized consent for the use of Ambient Listening during the visit with  Yasmani Schmidt DO for chart documentation. 8/31/2024  04:03  EDT

## 2024-09-05 DIAGNOSIS — M10.9 ACUTE GOUT INVOLVING TOE OF LEFT FOOT, UNSPECIFIED CAUSE: ICD-10-CM

## 2024-09-05 RX ORDER — ALLOPURINOL 100 MG/1
TABLET ORAL
Qty: 30 TABLET | Refills: 0 | Status: SHIPPED | OUTPATIENT
Start: 2024-09-05

## 2024-09-10 DIAGNOSIS — I10 PRIMARY HYPERTENSION: ICD-10-CM

## 2024-09-10 RX ORDER — HYDROCHLOROTHIAZIDE 12.5 MG/1
12.5 TABLET ORAL DAILY
Qty: 90 TABLET | Refills: 0 | Status: SHIPPED | OUTPATIENT
Start: 2024-09-10

## 2024-09-28 DIAGNOSIS — M10.9 ACUTE GOUT INVOLVING TOE OF LEFT FOOT, UNSPECIFIED CAUSE: ICD-10-CM

## 2024-09-30 RX ORDER — ALLOPURINOL 100 MG/1
TABLET ORAL
Qty: 30 TABLET | Refills: 2 | Status: SHIPPED | OUTPATIENT
Start: 2024-09-30

## 2024-10-17 DIAGNOSIS — I10 PRIMARY HYPERTENSION: ICD-10-CM

## 2024-10-17 RX ORDER — LISINOPRIL 5 MG/1
5 TABLET ORAL DAILY
Qty: 30 TABLET | Refills: 0 | OUTPATIENT
Start: 2024-10-17

## 2024-10-18 ENCOUNTER — TELEPHONE (OUTPATIENT)
Dept: FAMILY MEDICINE CLINIC | Facility: CLINIC | Age: 64
End: 2024-10-18
Payer: MEDICARE

## 2024-10-18 DIAGNOSIS — I10 PRIMARY HYPERTENSION: Primary | ICD-10-CM

## 2024-10-18 NOTE — TELEPHONE ENCOUNTER
Patient came in to the office requesting a refill on lisinopril. States he blood pressure has been fluctuating from normal to high and she thinks she needs to restart, has an appointment next month. Please advise

## 2024-10-20 RX ORDER — LISINOPRIL 5 MG/1
5 TABLET ORAL DAILY
Qty: 30 TABLET | Refills: 2 | Status: SHIPPED | OUTPATIENT
Start: 2024-10-20

## 2024-10-20 NOTE — TELEPHONE ENCOUNTER
I will send in a really small dose to see if it helps lower BP.  Don't want to try a higher dose until she is seen in the office.  May come in for a BP check as well

## 2024-10-26 DIAGNOSIS — E03.9 ACQUIRED HYPOTHYROIDISM: ICD-10-CM

## 2024-10-28 ENCOUNTER — CLINICAL SUPPORT (OUTPATIENT)
Dept: FAMILY MEDICINE CLINIC | Facility: CLINIC | Age: 64
End: 2024-10-28
Payer: MEDICARE

## 2024-10-28 RX ORDER — ATORVASTATIN CALCIUM 10 MG/1
10 TABLET, FILM COATED ORAL DAILY
Qty: 90 TABLET | Refills: 0 | Status: SHIPPED | OUTPATIENT
Start: 2024-10-28

## 2024-10-28 RX ORDER — LEVOTHYROXINE SODIUM 112 UG/1
112 TABLET ORAL DAILY
Qty: 90 TABLET | Refills: 0 | Status: SHIPPED | OUTPATIENT
Start: 2024-10-28

## 2024-11-07 ENCOUNTER — TELEPHONE (OUTPATIENT)
Dept: FAMILY MEDICINE CLINIC | Facility: CLINIC | Age: 64
End: 2024-11-07
Payer: MEDICARE

## 2024-11-07 DIAGNOSIS — L08.9 SKIN INFECTION: Primary | ICD-10-CM

## 2024-11-07 NOTE — TELEPHONE ENCOUNTER
Pt stopped by the office. She trimmed a hang nail and now she has two fingers that have cellulitis. They are very red and warm.

## 2024-11-08 RX ORDER — SULFAMETHOXAZOLE AND TRIMETHOPRIM 800; 160 MG/1; MG/1
1 TABLET ORAL 2 TIMES DAILY
Qty: 10 TABLET | Refills: 0 | Status: SHIPPED | OUTPATIENT
Start: 2024-11-08 | End: 2024-11-13

## 2024-12-03 DIAGNOSIS — I10 PRIMARY HYPERTENSION: ICD-10-CM

## 2024-12-03 RX ORDER — HYDROCHLOROTHIAZIDE 12.5 MG/1
12.5 TABLET ORAL DAILY
Qty: 90 TABLET | Refills: 0 | Status: SHIPPED | OUTPATIENT
Start: 2024-12-03

## 2024-12-20 DIAGNOSIS — M10.9 ACUTE GOUT INVOLVING TOE OF LEFT FOOT, UNSPECIFIED CAUSE: ICD-10-CM

## 2024-12-20 RX ORDER — ALLOPURINOL 100 MG/1
100 TABLET ORAL DAILY
Qty: 30 TABLET | Refills: 0 | Status: SHIPPED | OUTPATIENT
Start: 2024-12-20

## 2025-01-10 DIAGNOSIS — I10 PRIMARY HYPERTENSION: ICD-10-CM

## 2025-01-10 RX ORDER — LISINOPRIL 5 MG/1
5 TABLET ORAL DAILY
Qty: 30 TABLET | Refills: 0 | Status: SHIPPED | OUTPATIENT
Start: 2025-01-10

## 2025-01-10 NOTE — TELEPHONE ENCOUNTER
Rx Refill Note  Requested Prescriptions     Pending Prescriptions Disp Refills    lisinopril (PRINIVIL,ZESTRIL) 5 MG tablet [Pharmacy Med Name: Lisinopril 5 MG Oral Tablet] 30 tablet 0     Sig: Take 1 tablet by mouth once daily      Last office visit with prescribing clinician: 8/20/2024   Last telemedicine visit with prescribing clinician: Visit date not found   Next office visit with prescribing clinician: Visit date not found     Sent a 30 day RX. Pt is needing an appointment.     Okay for the HUB to relay message

## 2025-01-13 DIAGNOSIS — M10.9 ACUTE GOUT INVOLVING TOE OF LEFT FOOT, UNSPECIFIED CAUSE: ICD-10-CM

## 2025-01-13 RX ORDER — ALLOPURINOL 100 MG/1
TABLET ORAL
Qty: 15 TABLET | Refills: 0 | Status: SHIPPED | OUTPATIENT
Start: 2025-01-13

## 2025-01-23 DIAGNOSIS — M10.9 ACUTE GOUT INVOLVING TOE OF LEFT FOOT, UNSPECIFIED CAUSE: ICD-10-CM

## 2025-01-23 RX ORDER — ALLOPURINOL 100 MG/1
100 TABLET ORAL DAILY
Qty: 30 TABLET | Refills: 0 | Status: SHIPPED | OUTPATIENT
Start: 2025-01-23

## 2025-01-25 DIAGNOSIS — E03.9 ACQUIRED HYPOTHYROIDISM: ICD-10-CM

## 2025-01-27 RX ORDER — LEVOTHYROXINE SODIUM 112 UG/1
112 TABLET ORAL DAILY
Qty: 90 TABLET | Refills: 0 | Status: SHIPPED | OUTPATIENT
Start: 2025-01-27

## 2025-01-27 NOTE — TELEPHONE ENCOUNTER
Rx Refill Note  Requested Prescriptions     Pending Prescriptions Disp Refills    levothyroxine (SYNTHROID, LEVOTHROID) 112 MCG tablet [Pharmacy Med Name: Levothyroxine Sodium 112 MCG Oral Tablet] 90 tablet 0     Sig: Take 1 tablet by mouth once daily      Last office visit with prescribing clinician: 8/20/2024   Last telemedicine visit with prescribing clinician: Visit date not found   Next office visit with prescribing clinician: 2/25/2025

## 2025-02-05 DIAGNOSIS — I10 PRIMARY HYPERTENSION: ICD-10-CM

## 2025-02-05 RX ORDER — ATORVASTATIN CALCIUM 10 MG/1
10 TABLET, FILM COATED ORAL DAILY
Qty: 30 TABLET | Refills: 0 | Status: SHIPPED | OUTPATIENT
Start: 2025-02-05

## 2025-02-05 RX ORDER — LISINOPRIL 5 MG/1
5 TABLET ORAL DAILY
Qty: 30 TABLET | Refills: 0 | Status: SHIPPED | OUTPATIENT
Start: 2025-02-05

## 2025-02-21 DIAGNOSIS — M10.9 ACUTE GOUT INVOLVING TOE OF LEFT FOOT, UNSPECIFIED CAUSE: ICD-10-CM

## 2025-02-21 RX ORDER — ALLOPURINOL 100 MG/1
100 TABLET ORAL DAILY
Qty: 7 TABLET | Refills: 0 | Status: SHIPPED | OUTPATIENT
Start: 2025-02-21

## 2025-02-23 DIAGNOSIS — M10.9 ACUTE GOUT INVOLVING TOE OF LEFT FOOT, UNSPECIFIED CAUSE: ICD-10-CM

## 2025-02-24 RX ORDER — ALLOPURINOL 100 MG/1
100 TABLET ORAL DAILY
Qty: 7 TABLET | Refills: 0 | OUTPATIENT
Start: 2025-02-24

## 2025-02-26 DIAGNOSIS — I10 PRIMARY HYPERTENSION: ICD-10-CM

## 2025-02-28 RX ORDER — HYDROCHLOROTHIAZIDE 12.5 MG/1
12.5 TABLET ORAL DAILY
Qty: 90 TABLET | Refills: 0 | OUTPATIENT
Start: 2025-02-28

## 2025-03-03 DIAGNOSIS — I10 PRIMARY HYPERTENSION: ICD-10-CM

## 2025-03-03 RX ORDER — LISINOPRIL 5 MG/1
5 TABLET ORAL DAILY
Qty: 30 TABLET | Refills: 0 | OUTPATIENT
Start: 2025-03-03

## 2025-03-17 DIAGNOSIS — I10 PRIMARY HYPERTENSION: ICD-10-CM

## 2025-03-17 RX ORDER — ATORVASTATIN CALCIUM 10 MG/1
10 TABLET, FILM COATED ORAL DAILY
Qty: 30 TABLET | Refills: 0 | OUTPATIENT
Start: 2025-03-17

## 2025-03-17 RX ORDER — LISINOPRIL 5 MG/1
5 TABLET ORAL DAILY
Qty: 30 TABLET | Refills: 0 | OUTPATIENT
Start: 2025-03-17

## 2025-04-03 ENCOUNTER — TELEPHONE (OUTPATIENT)
Dept: FAMILY MEDICINE CLINIC | Facility: CLINIC | Age: 65
End: 2025-04-03
Payer: MEDICARE

## 2025-04-03 DIAGNOSIS — I10 PRIMARY HYPERTENSION: ICD-10-CM

## 2025-04-03 RX ORDER — LISINOPRIL 5 MG/1
5 TABLET ORAL DAILY
Qty: 7 TABLET | Refills: 0 | Status: SHIPPED | OUTPATIENT
Start: 2025-04-03

## 2025-04-03 RX ORDER — ATORVASTATIN CALCIUM 10 MG/1
10 TABLET, FILM COATED ORAL DAILY
Qty: 7 TABLET | Refills: 0 | Status: SHIPPED | OUTPATIENT
Start: 2025-04-03

## 2025-04-03 RX ORDER — HYDROCHLOROTHIAZIDE 12.5 MG/1
12.5 TABLET ORAL DAILY
Qty: 7 TABLET | Refills: 0 | Status: SHIPPED | OUTPATIENT
Start: 2025-04-03

## 2025-04-03 NOTE — TELEPHONE ENCOUNTER
"  Caller: Noemi Magallon \"Annel\"    Relationship: Self    Best call back number: [phone]     What was the call regarding: PT REQUESTS A CALLBACK REGARDING HER UPCOMING APPT 4/30/25    "

## 2025-04-05 DIAGNOSIS — I10 PRIMARY HYPERTENSION: ICD-10-CM

## 2025-04-07 RX ORDER — LISINOPRIL 5 MG/1
5 TABLET ORAL DAILY
Qty: 7 TABLET | Refills: 0 | OUTPATIENT
Start: 2025-04-07

## 2025-04-09 ENCOUNTER — OFFICE VISIT (OUTPATIENT)
Dept: FAMILY MEDICINE CLINIC | Facility: CLINIC | Age: 65
End: 2025-04-09
Payer: MEDICARE

## 2025-04-09 VITALS
HEIGHT: 60 IN | RESPIRATION RATE: 16 BRPM | WEIGHT: 174.6 LBS | OXYGEN SATURATION: 96 % | SYSTOLIC BLOOD PRESSURE: 124 MMHG | BODY MASS INDEX: 34.28 KG/M2 | DIASTOLIC BLOOD PRESSURE: 88 MMHG | HEART RATE: 72 BPM

## 2025-04-09 DIAGNOSIS — E03.9 ACQUIRED HYPOTHYROIDISM: ICD-10-CM

## 2025-04-09 DIAGNOSIS — I10 PRIMARY HYPERTENSION: ICD-10-CM

## 2025-04-09 DIAGNOSIS — E78.00 HYPERCHOLESTEROLEMIA: Primary | ICD-10-CM

## 2025-04-09 DIAGNOSIS — M10.9 ACUTE GOUT INVOLVING TOE OF LEFT FOOT, UNSPECIFIED CAUSE: ICD-10-CM

## 2025-04-09 RX ORDER — ALLOPURINOL 100 MG/1
100 TABLET ORAL DAILY
Qty: 7 TABLET | Refills: 0 | Status: SHIPPED | OUTPATIENT
Start: 2025-04-09 | End: 2025-04-11

## 2025-04-09 RX ORDER — HYDROCHLOROTHIAZIDE 12.5 MG/1
12.5 TABLET ORAL DAILY
Qty: 7 TABLET | Refills: 0 | Status: SHIPPED | OUTPATIENT
Start: 2025-04-09 | End: 2025-04-18 | Stop reason: SDUPTHER

## 2025-04-09 RX ORDER — LEVOTHYROXINE SODIUM 112 UG/1
112 TABLET ORAL DAILY
Qty: 90 TABLET | Refills: 0 | Status: SHIPPED | OUTPATIENT
Start: 2025-04-09

## 2025-04-09 RX ORDER — ATORVASTATIN CALCIUM 10 MG/1
10 TABLET, FILM COATED ORAL DAILY
Qty: 30 TABLET | Refills: 2 | Status: SHIPPED | OUTPATIENT
Start: 2025-04-09

## 2025-04-09 NOTE — PROGRESS NOTES
Yasmani Schmidt,   [facility]  [facility]  [place] [id number] [phone]    Subjective      Name Noemi Magallon MRN [id number]    1960 AGE/SEX 64 y.o. / female      Chief Complaint Chief Complaint   Patient presents with    Med Management     Here for check up and medication refills          Visit History for  2025    Noemi Magallon is a 64 y.o. female who presented today for Med Management (Here for check up and medication refills )       History of Present Illness  The patient presents for evaluation of hypertension, hypothyroidism, and mental health.    She reports a weight loss of 2 pounds, bringing her current weight to 174 pounds. Her physical activity is limited to walking, which she finds exhausting and often results in shortness of breath. She experiences occasional pedal edema. She has been adhering to her prescribed medication regimen, including lisinopril, with the exception of hydrochlorothiazide, which she has run out of. She does not experience any episodes of lightheadedness.    She has approximately one week's supply of her thyroid medication remaining.    Her mental health is stable. She has not had recent contact with her son or grandson but maintains a positive relationship with her daughter. She continues to reside with her daughter and assists with household chores, although prolonged standing can lead to back discomfort.         Medications and Allergies   Current Outpatient Medications   Medication Instructions    allopurinol (ZYLOPRIM) 100 mg, Oral, Daily    ASPIRIN 81 PO 81 mg    atorvastatin (LIPITOR) 10 mg, Oral, Daily    hydroCHLOROthiazide 12.5 mg, Oral, Daily    levothyroxine (SYNTHROID, LEVOTHROID) 112 mcg, Oral, Daily     Allergies   Allergen Reactions    Ibuprofen Other (See Comments)     Pt states she can't take this because of kidney issues      I have reviewed the above medications and allergies     Objective:     "  Vitals Vitals:    04/09/25 1214   BP: 124/88   BP Location: Left arm   Patient Position: Sitting   Cuff Size: Adult   Pulse: 72   Resp: 16   SpO2: 96%   Weight: 79.2 kg (174 lb 9.6 oz)   Height: 152.4 cm (60\")     Body mass index is 34.1 kg/m².    Physical Exam  Vitals reviewed.   Constitutional:       General: She is not in acute distress.     Appearance: She is not ill-appearing.   Cardiovascular:      Rate and Rhythm: Normal rate and regular rhythm.   Pulmonary:      Effort: Pulmonary effort is normal.      Breath sounds: Normal breath sounds.   Neurological:      Mental Status: She is alert.   Psychiatric:         Mood and Affect: Mood normal.         Behavior: Behavior normal.         Thought Content: Thought content normal.         Judgment: Judgment normal.          Physical Exam       Results  Laboratory Studies  Iron levels are good.     Assessment/Plan   Issues Addressed/ Plan   Diagnosis Plan   1. Hypercholesterolemia  Lipid Panel    atorvastatin (LIPITOR) 10 MG tablet      2. Primary hypertension  Comprehensive Metabolic Panel      3. Acquired hypothyroidism  levothyroxine (SYNTHROID, LEVOTHROID) 112 MCG tablet      4. Acute gout involving toe of left foot, unspecified cause           Assessment & Plan  1. Hypertension.  Her blood pressure readings have shown significant improvement, currently at 124/88. The lisinopril will be discontinued, and she will continue with the hydrochlorothiazide regimen. She is advised to maintain an active lifestyle through regular walking exercises.    2. Hypothyroidism.  A thyroid function test will be conducted to ensure the current medication dosage is appropriate, especially considering her recent weight loss. She is advised to come in on Friday for the lab work.    3. Mental health.  She is encouraged to maintain social connections with her family members.           There are no Patient Instructions on file for this visit.   Follow up  recommended Return in about 3 " months (around 7/9/2025).   - Dragon voice recognition software was utilized to complete this chart.  Every reasonable attempt was made to edit and correct the text, however some incorrect words may remain.   Yasmani Schmidt DO    Patient or patient representative verbalized consent for the use of Ambient Listening during the visit with  Yasmani Schmidt DO for chart documentation. 4/24/2025  23:38 EDT

## 2025-04-11 ENCOUNTER — CLINICAL SUPPORT (OUTPATIENT)
Dept: FAMILY MEDICINE CLINIC | Facility: CLINIC | Age: 65
End: 2025-04-11
Payer: MEDICARE

## 2025-04-11 DIAGNOSIS — E78.00 HYPERCHOLESTEROLEMIA: ICD-10-CM

## 2025-04-11 DIAGNOSIS — M10.9 ACUTE GOUT INVOLVING TOE OF LEFT FOOT, UNSPECIFIED CAUSE: ICD-10-CM

## 2025-04-11 DIAGNOSIS — I10 PRIMARY HYPERTENSION: ICD-10-CM

## 2025-04-11 DIAGNOSIS — E03.9 ACQUIRED HYPOTHYROIDISM: Primary | ICD-10-CM

## 2025-04-11 PROCEDURE — 36415 COLL VENOUS BLD VENIPUNCTURE: CPT | Performed by: STUDENT IN AN ORGANIZED HEALTH CARE EDUCATION/TRAINING PROGRAM

## 2025-04-11 RX ORDER — ALLOPURINOL 100 MG/1
100 TABLET ORAL DAILY
Qty: 30 TABLET | Refills: 2 | Status: SHIPPED | OUTPATIENT
Start: 2025-04-11

## 2025-04-11 NOTE — PROGRESS NOTES
Venipuncture Blood Specimen Collection  Venipuncture performed in left arm by Cyndi Rae MA with good hemostasis. Patient tolerated the procedure well without complications.   04/11/25   Cyndi Rae MA

## 2025-04-12 DIAGNOSIS — I10 PRIMARY HYPERTENSION: ICD-10-CM

## 2025-04-12 LAB
ALBUMIN SERPL-MCNC: 4.2 G/DL (ref 3.5–5.2)
ALBUMIN/GLOB SERPL: 1.5 G/DL
ALP SERPL-CCNC: 119 U/L (ref 39–117)
ALT SERPL-CCNC: 13 U/L (ref 1–33)
AST SERPL-CCNC: 21 U/L (ref 1–32)
BILIRUB SERPL-MCNC: 0.4 MG/DL (ref 0–1.2)
BUN SERPL-MCNC: 22 MG/DL (ref 8–23)
BUN/CREAT SERPL: 28.6 (ref 7–25)
CALCIUM SERPL-MCNC: 9.8 MG/DL (ref 8.6–10.5)
CHLORIDE SERPL-SCNC: 101 MMOL/L (ref 98–107)
CHOLEST SERPL-MCNC: 187 MG/DL (ref 0–200)
CO2 SERPL-SCNC: 29.3 MMOL/L (ref 22–29)
CREAT SERPL-MCNC: 0.77 MG/DL (ref 0.57–1)
EGFRCR SERPLBLD CKD-EPI 2021: 86.3 ML/MIN/1.73
GLOBULIN SER CALC-MCNC: 2.8 GM/DL
GLUCOSE SERPL-MCNC: 86 MG/DL (ref 65–99)
HDLC SERPL-MCNC: 64 MG/DL (ref 40–60)
LDLC SERPL CALC-MCNC: 102 MG/DL (ref 0–100)
POTASSIUM SERPL-SCNC: 4 MMOL/L (ref 3.5–5.2)
PROT SERPL-MCNC: 7 G/DL (ref 6–8.5)
SODIUM SERPL-SCNC: 142 MMOL/L (ref 136–145)
T4 FREE SERPL-MCNC: 1.73 NG/DL (ref 0.92–1.68)
TRIGL SERPL-MCNC: 118 MG/DL (ref 0–150)
TSH SERPL DL<=0.005 MIU/L-ACNC: 0.08 UIU/ML (ref 0.27–4.2)
VLDLC SERPL CALC-MCNC: 21 MG/DL (ref 5–40)

## 2025-04-14 RX ORDER — LISINOPRIL 5 MG/1
5 TABLET ORAL DAILY
Qty: 30 TABLET | Refills: 0 | OUTPATIENT
Start: 2025-04-14

## 2025-04-18 DIAGNOSIS — E03.9 ACQUIRED HYPOTHYROIDISM: ICD-10-CM

## 2025-04-18 DIAGNOSIS — I10 PRIMARY HYPERTENSION: ICD-10-CM

## 2025-04-18 RX ORDER — LEVOTHYROXINE SODIUM 112 UG/1
112 TABLET ORAL DAILY
Qty: 90 TABLET | Refills: 0 | OUTPATIENT
Start: 2025-04-18

## 2025-04-18 RX ORDER — HYDROCHLOROTHIAZIDE 12.5 MG/1
12.5 TABLET ORAL DAILY
Qty: 90 TABLET | Refills: 0 | Status: SHIPPED | OUTPATIENT
Start: 2025-04-18

## 2025-04-18 NOTE — TELEPHONE ENCOUNTER
"Caller: NaunNoemi \"Annel\"    Relationship: Self    Best call back number: [phone]     Requested Prescriptions:   Requested Prescriptions     Pending Prescriptions Disp Refills    hydroCHLOROthiazide 12.5 MG tablet 7 tablet 0     Sig: Take 1 tablet by mouth Daily.    levothyroxine (SYNTHROID, LEVOTHROID) 112 MCG tablet 90 tablet 0     Sig: Take 1 tablet by mouth Daily.        Pharmacy where request should be sent: [facility] [address] [place] [address] [phone] [facility] [phone]      Last office visit with prescribing clinician: 4/9/2025   Last telemedicine visit with prescribing clinician: Visit date not found   Next office visit with prescribing clinician: Visit date not found     Additional details provided by patient: PATIENT STATES SHE IS OUT OF THIS MEDICATION    Does the patient have less than a 3 day supply:  [x] Yes  [] No    [place] Rep   04/18/25 13:02 EDT       "

## 2025-05-09 DIAGNOSIS — E78.00 HYPERCHOLESTEROLEMIA: ICD-10-CM

## 2025-05-09 RX ORDER — ATORVASTATIN CALCIUM 10 MG/1
10 TABLET, FILM COATED ORAL DAILY
Qty: 90 TABLET | Refills: 0 | Status: SHIPPED | OUTPATIENT
Start: 2025-05-09

## 2025-05-30 DIAGNOSIS — I10 PRIMARY HYPERTENSION: ICD-10-CM

## 2025-05-30 RX ORDER — LISINOPRIL 5 MG/1
5 TABLET ORAL DAILY
Qty: 30 TABLET | Refills: 0 | OUTPATIENT
Start: 2025-05-30

## 2025-06-02 DIAGNOSIS — I10 PRIMARY HYPERTENSION: ICD-10-CM

## 2025-06-03 RX ORDER — LISINOPRIL 5 MG/1
5 TABLET ORAL DAILY
Qty: 30 TABLET | Refills: 0 | OUTPATIENT
Start: 2025-06-03

## 2025-07-01 DIAGNOSIS — M10.9 ACUTE GOUT INVOLVING TOE OF LEFT FOOT, UNSPECIFIED CAUSE: ICD-10-CM

## 2025-07-01 RX ORDER — ALLOPURINOL 100 MG/1
100 TABLET ORAL DAILY
Qty: 30 TABLET | Refills: 0 | Status: SHIPPED | OUTPATIENT
Start: 2025-07-01

## 2025-07-09 DIAGNOSIS — I10 PRIMARY HYPERTENSION: ICD-10-CM

## 2025-07-09 RX ORDER — HYDROCHLOROTHIAZIDE 12.5 MG/1
12.5 TABLET ORAL DAILY
Qty: 90 TABLET | Refills: 0 | Status: SHIPPED | OUTPATIENT
Start: 2025-07-09

## 2025-07-17 DIAGNOSIS — E03.9 ACQUIRED HYPOTHYROIDISM: ICD-10-CM

## 2025-07-17 RX ORDER — LEVOTHYROXINE SODIUM 112 UG/1
112 TABLET ORAL DAILY
Qty: 90 TABLET | Refills: 0 | Status: SHIPPED | OUTPATIENT
Start: 2025-07-17

## 2025-07-30 DIAGNOSIS — M10.9 ACUTE GOUT INVOLVING TOE OF LEFT FOOT, UNSPECIFIED CAUSE: ICD-10-CM

## 2025-07-30 RX ORDER — ALLOPURINOL 100 MG/1
100 TABLET ORAL DAILY
Qty: 30 TABLET | Refills: 0 | Status: SHIPPED | OUTPATIENT
Start: 2025-07-30

## 2025-08-25 DIAGNOSIS — M10.9 ACUTE GOUT INVOLVING TOE OF LEFT FOOT, UNSPECIFIED CAUSE: ICD-10-CM

## 2025-08-25 RX ORDER — ALLOPURINOL 100 MG/1
100 TABLET ORAL DAILY
Qty: 30 TABLET | Refills: 0 | Status: SHIPPED | OUTPATIENT
Start: 2025-08-25